# Patient Record
Sex: MALE | Race: WHITE | NOT HISPANIC OR LATINO | Employment: OTHER | URBAN - NONMETROPOLITAN AREA
[De-identification: names, ages, dates, MRNs, and addresses within clinical notes are randomized per-mention and may not be internally consistent; named-entity substitution may affect disease eponyms.]

---

## 2021-12-15 ENCOUNTER — OFFICE VISIT (OUTPATIENT)
Dept: CARDIOLOGY | Facility: CLINIC | Age: 81
End: 2021-12-15

## 2021-12-15 VITALS
OXYGEN SATURATION: 98 % | HEART RATE: 60 BPM | WEIGHT: 253 LBS | SYSTOLIC BLOOD PRESSURE: 110 MMHG | BODY MASS INDEX: 33.53 KG/M2 | DIASTOLIC BLOOD PRESSURE: 62 MMHG | HEIGHT: 73 IN

## 2021-12-15 DIAGNOSIS — I25.810 CORONARY ARTERY DISEASE INVOLVING CORONARY BYPASS GRAFT OF NATIVE HEART WITHOUT ANGINA PECTORIS: ICD-10-CM

## 2021-12-15 DIAGNOSIS — Z98.890 HISTORY OF LOOP RECORDER: ICD-10-CM

## 2021-12-15 DIAGNOSIS — I45.10 RBBB: ICD-10-CM

## 2021-12-15 DIAGNOSIS — Z95.818 PRESENCE OF WATCHMAN LEFT ATRIAL APPENDAGE CLOSURE DEVICE: ICD-10-CM

## 2021-12-15 DIAGNOSIS — R06.09 DOE (DYSPNEA ON EXERTION): ICD-10-CM

## 2021-12-15 DIAGNOSIS — I48.0 PAF (PAROXYSMAL ATRIAL FIBRILLATION) (HCC): ICD-10-CM

## 2021-12-15 DIAGNOSIS — Z95.5 STENTED CORONARY ARTERY: Primary | ICD-10-CM

## 2021-12-15 DIAGNOSIS — I51.7 LVH (LEFT VENTRICULAR HYPERTROPHY): ICD-10-CM

## 2021-12-15 DIAGNOSIS — E78.2 MIXED HYPERLIPIDEMIA: ICD-10-CM

## 2021-12-15 PROBLEM — I10 HTN (HYPERTENSION): Status: RESOLVED | Noted: 2021-12-15 | Resolved: 2021-12-15

## 2021-12-15 PROBLEM — I10 HTN (HYPERTENSION): Status: ACTIVE | Noted: 2021-12-15

## 2021-12-15 PROCEDURE — 99204 OFFICE O/P NEW MOD 45 MIN: CPT | Performed by: INTERNAL MEDICINE

## 2021-12-15 PROCEDURE — 93000 ELECTROCARDIOGRAM COMPLETE: CPT | Performed by: INTERNAL MEDICINE

## 2021-12-15 RX ORDER — LANOLIN ALCOHOL/MO/W.PET/CERES
400 CREAM (GRAM) TOPICAL DAILY
COMMUNITY
End: 2022-08-16

## 2021-12-15 RX ORDER — ASCORBIC ACID 500 MG
500 TABLET ORAL 2 TIMES DAILY
COMMUNITY
End: 2022-09-20

## 2021-12-15 RX ORDER — ATORVASTATIN CALCIUM 40 MG/1
40 TABLET, FILM COATED ORAL DAILY
COMMUNITY

## 2021-12-15 RX ORDER — ASPIRIN 81 MG/1
81 TABLET ORAL DAILY
COMMUNITY

## 2021-12-15 RX ORDER — TRIAMCINOLONE ACETONIDE 1 MG/ML
LOTION TOPICAL AS NEEDED
COMMUNITY
End: 2022-09-20

## 2021-12-15 RX ORDER — LANOLIN ALCOHOL/MO/W.PET/CERES
1000 CREAM (GRAM) TOPICAL DAILY
COMMUNITY
End: 2022-08-16

## 2021-12-15 RX ORDER — IBUPROFEN 200 MG
200 TABLET ORAL AS NEEDED
COMMUNITY
End: 2022-09-20

## 2021-12-15 RX ORDER — CLOBETASOL PROPIONATE 0.5 MG/G
1 CREAM TOPICAL AS NEEDED
COMMUNITY
End: 2022-09-20

## 2021-12-15 RX ORDER — HYDROCHLOROTHIAZIDE 12.5 MG/1
12.5 TABLET ORAL DAILY
COMMUNITY
End: 2022-08-16

## 2021-12-15 RX ORDER — CHLORAL HYDRATE 500 MG
CAPSULE ORAL 2 TIMES DAILY
COMMUNITY
End: 2022-08-16

## 2021-12-15 RX ORDER — NITROGLYCERIN 0.4 MG/1
TABLET SUBLINGUAL
Qty: 25 TABLET | Refills: 11 | Status: SHIPPED | OUTPATIENT
Start: 2021-12-15 | End: 2022-11-09 | Stop reason: SDUPTHER

## 2021-12-15 RX ORDER — CETIRIZINE HYDROCHLORIDE 10 MG/1
10 TABLET ORAL AS NEEDED
COMMUNITY
End: 2022-08-16

## 2021-12-15 RX ORDER — AMLODIPINE BESYLATE 2.5 MG/1
2.5 TABLET ORAL DAILY
COMMUNITY
End: 2022-08-16 | Stop reason: SDUPTHER

## 2021-12-15 RX ORDER — METOPROLOL TARTRATE 50 MG/1
12.5 TABLET, FILM COATED ORAL 2 TIMES DAILY
COMMUNITY
End: 2022-09-20 | Stop reason: SDUPTHER

## 2021-12-15 NOTE — PROGRESS NOTES
Fred Johnson  [id number]  1940  81 y.o.  male    Referring Provider: Chico Perkins MD    Reason for  Visit:  Initial visit to establish care with myself for ongoing longitudinal care related to cardiovascular issues   paroxysmal atrial fibrillation coronary artery disease stented coronary artery   Has left atrial appendage closure     Subjective    Mild chronic exertional shortness of breath on exertion relieved with rest  No significant cough or wheezing    No palpitations  No associated chest pain  No significant pedal edema    No fever or chills  No significant expectoration    No hemoptysis  No presyncope or syncope    Tolerating current medications well with no untoward side effects   Compliant with prescribed medication regimen. Tries to adhere to cardiac diet.     No bleeding, excessive bruising, gait instability or fall risks    BP well controlled at home.           History of present illness:  Fred Johnson is a 81 y.o. yo male with paroxysmal atrial fibrillation coronary artery disease stented coronary artery   Has left atrial appendage closure    who presents today for   Chief Complaint   Patient presents with   • Coronary Artery Disease     Has been well. No chest pain, palpitations, SOA, or edema.    • Establish Care     New patient   .    History  Past Medical History:   Diagnosis Date   • Coronary artery disease    ,   Past Surgical History:   Procedure Laterality Date   • ACHILLES TENDON REPAIR     • CARDIAC CATHETERIZATION     • CATARACT EXTRACTION, BILATERAL     • CLAVICLE SURGERY Right    • CORONARY STENT PLACEMENT     • REPLACEMENT TOTAL KNEE BILATERAL     • ROTATOR CUFF REPAIR     • WRIST ARTHROPLASTY Right    ,   History reviewed. No pertinent family history.,   Social History     Tobacco Use   • Smoking status: Never Smoker   • Smokeless tobacco: Never Used   Vaping Use   • Vaping Use: Never used   Substance Use Topics   • Alcohol use: Yes     Comment: occ   • Drug use: Never    ,     Medications  Current Outpatient Medications   Medication Sig Dispense Refill   • amLODIPine (NORVASC) 2.5 MG tablet Take 2.5 mg by mouth Daily.     • ascorbic acid (VITAMIN C) 500 MG tablet Take 500 mg by mouth 2 (Two) Times a Day.     • aspirin 81 MG EC tablet Take 81 mg by mouth Daily.     • atorvastatin (LIPITOR) 40 MG tablet Take 40 mg by mouth Daily.     • cetirizine (zyrTEC) 10 MG tablet Take 10 mg by mouth As Needed for Allergies.     • clobetasol (TEMOVATE) 0.05 % cream Apply 1 application topically to the appropriate area as directed As Needed.     • folic acid (FOLVITE) 400 MCG tablet Take 400 mcg by mouth Daily.     • hydroCHLOROthiazide (HYDRODIURIL) 12.5 MG tablet Take 12.5 mg by mouth Daily.     • ibuprofen (ADVIL,MOTRIN) 200 MG tablet Take 200 mg by mouth As Needed for Mild Pain .     • methotrexate 2.5 MG tablet Take  by mouth 3 (Three) Doses Each Week. Take Doses 12 (Twelve) Hours Apart.     • metoprolol tartrate (LOPRESSOR) 50 MG tablet Take 25 mg by mouth 2 (Two) Times a Day.     • Omega-3 1000 MG capsule Take  by mouth 2 (Two) Times a Day.     • triamcinolone (KENALOG) 0.1 % lotion Apply  topically to the appropriate area as directed As Needed.     • vitamin B-12 (CYANOCOBALAMIN) 1000 MCG tablet Take 1,000 mcg by mouth Daily.     • nitroglycerin (NITROSTAT) 0.4 MG SL tablet 1 under the tongue as needed for angina, may repeat q5mins for up three doses 25 tablet 11     No current facility-administered medications for this visit.       Allergies:  Percocet [oxycodone-acetaminophen]    Review of Systems  Review of Systems   Constitutional: Negative.   HENT: Negative.    Eyes: Negative.    Cardiovascular: Positive for dyspnea on exertion. Negative for chest pain, claudication, cyanosis, irregular heartbeat, leg swelling, near-syncope, orthopnea, palpitations, paroxysmal nocturnal dyspnea and syncope.   Respiratory: Negative.    Endocrine: Negative.    Hematologic/Lymphatic: Negative.    Skin:  "Negative.    Musculoskeletal: Positive for arthritis, back pain and joint pain.   Gastrointestinal: Negative for anorexia.   Genitourinary: Negative.    Neurological: Negative.    Psychiatric/Behavioral: Negative.        Objective     Physical Exam:  /62   Pulse 60   Ht 185.4 cm (73\")   Wt 115 kg (253 lb)   SpO2 98%   BMI 33.38 kg/m²     Physical Exam  Constitutional:       Appearance: He is well-developed.   HENT:      Head: Normocephalic.   Neck:      Vascular: Normal carotid pulses. No carotid bruit or JVD.      Trachea: No tracheal tenderness or tracheal deviation.   Cardiovascular:      Rate and Rhythm: Regular rhythm.      Pulses: Normal pulses.      Heart sounds: Normal heart sounds.   Pulmonary:      Effort: Pulmonary effort is normal.      Breath sounds: No stridor.   Abdominal:      General: There is no distension.      Palpations: Abdomen is soft.      Tenderness: There is no abdominal tenderness.   Musculoskeletal:      Cervical back: No edema.   Skin:     General: Skin is warm.   Neurological:      Mental Status: He is alert.      Cranial Nerves: No cranial nerve deficit.      Sensory: No sensory deficit.   Psychiatric:         Speech: Speech normal.         Behavior: Behavior normal.         Results Review:     ____________________________________________________________________________________________________________________________________________  Health maintenance and recommendations    Low salt/ HTN/ Heart healthy carbohydrate restricted cardiac diet   The patient is advised to reduce or avoid caffeine or other cardiac stimulants.   Minimize or avoid  NSAID-type medications      Monitor for any signs of bleeding including red or dark stools. Fall precautions.   Advised staying uptodate with immunizations per established standard guidelines.    Offered to give patient  a copy of my notes     Questions were encouraged, asked and answered to the patient's  understanding and satisfaction. " Questions if any regarding current medications and side effects, need for refills and importance of compliance to medications stressed.    Reviewed available prior notes, consults, prior visits, laboratory findings, radiology and cardiology relevant reports. Updated chart as applicable. I have reviewed the patient's medical history in detail and updated the computerized patient record as relevant.      Updated patient regarding any new or relevant abnormalities on review of records or any new findings on physical exam. Mentioned to patient about purpose of visit and desirable health short and long term goals and objectives.    Primary to monitor CBC CMP Lipid panel and TSH as applicable    ___________________________________________________________________________________________________________________________________________     ECG 12 Lead    Date/Time: 12/15/2021 12:00 PM  Performed by: Everton Wasserman MD  Authorized by: Everton Wasserman MD   Comparison: not compared with previous ECG   Rhythm: sinus bradycardia  Rate: bradycardic  Conduction: right bundle branch block and 1st degree AV block  Q waves: II, III, aVF, V4, V5 and V6    Other findings: left ventricular hypertrophy    Clinical impression: abnormal EKG            Assessment/Plan   Diagnoses and all orders for this visit:    1. Stented coronary artery (Primary)    2. Coronary artery disease involving coronary bypass graft of native heart without angina pectoris    3. PAF (paroxysmal atrial fibrillation) (HCC)    4. Presence of Watchman left atrial appendage closure device    5. MUIR (dyspnea on exertion)  -     Adult Transthoracic Echo Complete w/ Color, Spectral and Contrast if necessary per protocol; Future    6. Mixed hyperlipidemia    7. RBBB    8. LVH (left ventricular hypertrophy)    9. History of loop recorder    Other orders  -     ECG 12 Lead  -     nitroglycerin (NITROSTAT) 0.4 MG SL tablet; 1 under the tongue as needed for angina, may repeat  q5mins for up three doses  Dispense: 25 tablet; Refill: 11          Plan    Orders Placed This Encounter   Procedures   • ECG 12 Lead     This order was created via procedure documentation     Order Specific Question:   Release to patient     Answer:   Immediate   • Adult Transthoracic Echo Complete w/ Color, Spectral and Contrast if necessary per protocol     Myocardial strain to be performed during echocardiogram as long as technically feasible     Standing Status:   Future     Standing Expiration Date:   12/15/2022     Order Specific Question:   Reason for exam?     Answer:   Dyspnea     Order Specific Question:   Release to patient     Answer:   Immediate      In absence of chest pains or any other suggestive symptoms stress testing is currently not warranted.  Discussed with patient.     Keep LDL below 70 mg/dl. Monitor liver and renal functions.   Monitor CBC, CMP, TSH (as indicated) and Lipid Panel by primary     S/L NTG PRN for chest pain, call me or go to ER if has to use S/L nitroglycerin   Requested Prescriptions     Signed Prescriptions Disp Refills   • nitroglycerin (NITROSTAT) 0.4 MG SL tablet 25 tablet 11     Si under the tongue as needed for angina, may repeat q5mins for up three doses        Check BP and heart rates twice daily initially till blood pressures and heart rates under good control and then at least 3x / week,   If blood pressures continue to be well-controlled then can check week a month  at home and bring a recording for review next visit  If BP >130/85 or < 100/60 persistently over 3 reading 30 mins apart or if heart rates persistently above 100 bpm or less than 55 bpm call sooner for evaluation and advise     Monitor for any signs of bleeding including red or dark stools as well as easy bruisabilty. Fall precautions.   Monitor cardiac rhythm device function regularly per established schedule or PRN      Follow up with DANII Monreal  or myself              Return in about 2  months (around 2/15/2022).

## 2022-02-16 ENCOUNTER — OFFICE VISIT (OUTPATIENT)
Dept: CARDIOLOGY | Facility: CLINIC | Age: 82
End: 2022-02-16

## 2022-02-16 ENCOUNTER — HOSPITAL ENCOUNTER (OUTPATIENT)
Dept: CARDIOLOGY | Facility: HOSPITAL | Age: 82
Discharge: HOME | End: 2022-02-16
Admitting: INTERNAL MEDICINE

## 2022-02-16 ENCOUNTER — CLINICAL SUPPORT NO REQUIREMENTS (OUTPATIENT)
Dept: CARDIOLOGY | Facility: CLINIC | Age: 82
End: 2022-02-16

## 2022-02-16 VITALS
HEART RATE: 52 BPM | BODY MASS INDEX: 33.4 KG/M2 | OXYGEN SATURATION: 94 % | HEIGHT: 73 IN | DIASTOLIC BLOOD PRESSURE: 60 MMHG | WEIGHT: 252 LBS | SYSTOLIC BLOOD PRESSURE: 112 MMHG

## 2022-02-16 VITALS
WEIGHT: 252 LBS | SYSTOLIC BLOOD PRESSURE: 110 MMHG | BODY MASS INDEX: 33.4 KG/M2 | HEIGHT: 73 IN | DIASTOLIC BLOOD PRESSURE: 62 MMHG

## 2022-02-16 DIAGNOSIS — Z95.5 STENTED CORONARY ARTERY: Primary | ICD-10-CM

## 2022-02-16 DIAGNOSIS — Z98.890 HISTORY OF LOOP RECORDER: ICD-10-CM

## 2022-02-16 DIAGNOSIS — E78.2 MIXED HYPERLIPIDEMIA: ICD-10-CM

## 2022-02-16 DIAGNOSIS — I25.810 CORONARY ARTERY DISEASE INVOLVING CORONARY BYPASS GRAFT OF NATIVE HEART WITHOUT ANGINA PECTORIS: ICD-10-CM

## 2022-02-16 DIAGNOSIS — I48.0 PAF (PAROXYSMAL ATRIAL FIBRILLATION): ICD-10-CM

## 2022-02-16 DIAGNOSIS — R06.09 DOE (DYSPNEA ON EXERTION): ICD-10-CM

## 2022-02-16 DIAGNOSIS — Z95.818 PRESENCE OF WATCHMAN LEFT ATRIAL APPENDAGE CLOSURE DEVICE: ICD-10-CM

## 2022-02-16 PROCEDURE — 93291 INTERROG DEV EVAL SCRMS IP: CPT | Performed by: INTERNAL MEDICINE

## 2022-02-16 PROCEDURE — 93306 TTE W/DOPPLER COMPLETE: CPT

## 2022-02-16 PROCEDURE — 99214 OFFICE O/P EST MOD 30 MIN: CPT | Performed by: INTERNAL MEDICINE

## 2022-02-16 PROCEDURE — 25010000002 PERFLUTREN 6.52 MG/ML SUSPENSION: Performed by: INTERNAL MEDICINE

## 2022-02-16 PROCEDURE — 93356 MYOCRD STRAIN IMG SPCKL TRCK: CPT | Performed by: INTERNAL MEDICINE

## 2022-02-16 PROCEDURE — 93356 MYOCRD STRAIN IMG SPCKL TRCK: CPT

## 2022-02-16 PROCEDURE — 93306 TTE W/DOPPLER COMPLETE: CPT | Performed by: INTERNAL MEDICINE

## 2022-02-16 RX ADMIN — PERFLUTREN 8.48 MG: 6.52 INJECTION, SUSPENSION INTRAVENOUS at 12:03

## 2022-02-16 NOTE — PROGRESS NOTES
Fred Johnson  [id number]  1940  81 y.o.  male    Referring Provider: Chico Perkins MD    Reason for  Visit:    Here for routine follow up   Initial visit to establish care with myself for ongoing longitudinal care related to cardiovascular issues   paroxysmal atrial fibrillation coronary artery disease stented coronary artery   Has left atrial appendage closure   No echo has been done till today     Subjective      Overall feels the same   No new events or complaints since last visit   Overall the patient feels no major change from baseline symptoms   Similar symptoms as during last visit      Mild chronic exertional shortness of breath on exertion relieved with rest  No significant cough or wheezing    No palpitations  No associated chest pain  No significant pedal edema    No fever or chills  No significant expectoration    No hemoptysis  No presyncope or syncope    Tolerating current medications well with no untoward side effects   Compliant with prescribed medication regimen. Tries to adhere to cardiac diet.     No bleeding, excessive bruising, gait instability or fall risks    BP well controlled at home.       Wants implantable loop recorder removed         History of present illness:  Fred Johnson is a 81 y.o. yo male with paroxysmal atrial fibrillation coronary artery disease stented coronary artery   Has left atrial appendage closure    who presents today for   Chief Complaint   Patient presents with   • Coronary Artery Disease     2 MONTH FOLLOW UP    .    History  Past Medical History:   Diagnosis Date   • Coronary artery disease    ,   Past Surgical History:   Procedure Laterality Date   • ACHILLES TENDON REPAIR     • CARDIAC CATHETERIZATION     • CATARACT EXTRACTION, BILATERAL     • CLAVICLE SURGERY Right    • CORONARY STENT PLACEMENT     • REPLACEMENT TOTAL KNEE BILATERAL     • ROTATOR CUFF REPAIR     • WRIST ARTHROPLASTY Right    ,   History reviewed. No pertinent family history.,    Social History     Tobacco Use   • Smoking status: Never Smoker   • Smokeless tobacco: Never Used   Vaping Use   • Vaping Use: Never used   Substance Use Topics   • Alcohol use: Yes     Comment: occ   • Drug use: Never   ,     Medications  Current Outpatient Medications   Medication Sig Dispense Refill   • amLODIPine (NORVASC) 2.5 MG tablet Take 2.5 mg by mouth Daily.     • ascorbic acid (VITAMIN C) 500 MG tablet Take 500 mg by mouth 2 (Two) Times a Day.     • aspirin 81 MG EC tablet Take 81 mg by mouth Daily.     • atorvastatin (LIPITOR) 40 MG tablet Take 40 mg by mouth Daily.     • cetirizine (zyrTEC) 10 MG tablet Take 10 mg by mouth As Needed for Allergies.     • clobetasol (TEMOVATE) 0.05 % cream Apply 1 application topically to the appropriate area as directed As Needed.     • folic acid (FOLVITE) 400 MCG tablet Take 400 mcg by mouth Daily.     • hydroCHLOROthiazide (HYDRODIURIL) 12.5 MG tablet Take 12.5 mg by mouth Daily.     • ibuprofen (ADVIL,MOTRIN) 200 MG tablet Take 200 mg by mouth As Needed for Mild Pain .     • methotrexate 2.5 MG tablet Take  by mouth 3 (Three) Doses Each Week. Take Doses 12 (Twelve) Hours Apart.     • metoprolol tartrate (LOPRESSOR) 50 MG tablet Take 25 mg by mouth 2 (Two) Times a Day.     • nitroglycerin (NITROSTAT) 0.4 MG SL tablet 1 under the tongue as needed for angina, may repeat q5mins for up three doses 25 tablet 11   • Omega-3 1000 MG capsule Take  by mouth 2 (Two) Times a Day.     • triamcinolone (KENALOG) 0.1 % lotion Apply  topically to the appropriate area as directed As Needed.     • vitamin B-12 (CYANOCOBALAMIN) 1000 MCG tablet Take 1,000 mcg by mouth Daily.       No current facility-administered medications for this visit.       Allergies:  Percocet [oxycodone-acetaminophen]    Review of Systems  Review of Systems   Constitutional: Negative.   HENT: Negative.    Eyes: Negative.    Cardiovascular: Positive for dyspnea on exertion. Negative for chest pain, claudication,  "cyanosis, irregular heartbeat, leg swelling, near-syncope, orthopnea, palpitations, paroxysmal nocturnal dyspnea and syncope.   Respiratory: Negative.    Endocrine: Negative.    Hematologic/Lymphatic: Negative.    Skin: Negative.    Musculoskeletal: Positive for arthritis, back pain and joint pain.   Gastrointestinal: Negative for anorexia.   Genitourinary: Negative.    Neurological: Negative.    Psychiatric/Behavioral: Negative.        Objective     Physical Exam:  /60   Pulse 52   Ht 185.4 cm (73\")   Wt 114 kg (252 lb)   SpO2 94%   BMI 33.25 kg/m²     Physical Exam  Constitutional:       Appearance: He is well-developed.   HENT:      Head: Normocephalic.   Neck:      Vascular: Normal carotid pulses. No carotid bruit or JVD.      Trachea: No tracheal tenderness or tracheal deviation.   Cardiovascular:      Rate and Rhythm: Regular rhythm.      Pulses: Normal pulses.      Heart sounds: Normal heart sounds.   Pulmonary:      Effort: Pulmonary effort is normal.      Breath sounds: No stridor.   Abdominal:      General: There is no distension.      Palpations: Abdomen is soft.      Tenderness: There is no abdominal tenderness.   Musculoskeletal:      Cervical back: No edema.   Skin:     General: Skin is warm.   Neurological:      Mental Status: He is alert.      Cranial Nerves: No cranial nerve deficit.      Sensory: No sensory deficit.   Psychiatric:         Speech: Speech normal.         Behavior: Behavior normal.         Results Review:      ____________________________________________________________________________________________________________________________________________  Health maintenance and recommendations    Low salt/ HTN/ Heart healthy carbohydrate restricted cardiac diet   The patient is advised to reduce or avoid caffeine or other cardiac stimulants.   Minimize or avoid  NSAID-type medications      Monitor for any signs of bleeding including red or dark stools. Fall precautions.   Advised " staying uptodate with immunizations per established standard guidelines.    Offered to give patient  a copy of my notes     Questions were encouraged, asked and answered to the patient's  understanding and satisfaction. Questions if any regarding current medications and side effects, need for refills and importance of compliance to medications stressed.    Reviewed available prior notes, consults, prior visits, laboratory findings, radiology and cardiology relevant reports. Updated chart as applicable. I have reviewed the patient's medical history in detail and updated the computerized patient record as relevant.      Updated patient regarding any new or relevant abnormalities on review of records or any new findings on physical exam. Mentioned to patient about purpose of visit and desirable health short and long term goals and objectives.    Primary to monitor CBC CMP Lipid panel and TSH as applicable    ___________________________________________________________________________________________________________________________________________   Procedures    Assessment/Plan   Diagnoses and all orders for this visit:    1. Stented coronary artery x 6 last > 1 year ago (Primary)    2. PAF (paroxysmal atrial fibrillation) (HCC)    3. Presence of Watchman left atrial appendage closure device    4. MUIR (dyspnea on exertion)    5. Coronary artery disease involving coronary bypass graft of native heart without angina pectoris    6. History of loop recorder    7. Mixed hyperlipidemia          Plan    Call for results of echo done today     Functioning implantable loop recorder    Monitor cardiac rhythm device function regularly per established schedule or PRN      In absence of chest pains or any other suggestive symptoms stress testing is currently not warranted.  Discussed with patient.     Keep LDL below 70 mg/dl. Monitor liver and renal functions.   Monitor CBC, CMP, TSH (as indicated) and Lipid Panel by primary     S/L  NTG PRN for chest pain, call me or go to ER if has to use S/L nitroglycerin     Check BP and heart rates twice daily initially till blood pressures and heart rates under good control and then at least 3x / week,   If blood pressures continue to be well-controlled then can check week a month  at home and bring a recording for review next visit  If BP >130/85 or < 100/60 persistently over 3 reading 30 mins apart or if heart rates persistently above 100 bpm or less than 55 bpm call sooner for evaluation and advise     Monitor for any signs of bleeding including red or dark stools as well as easy bruisabilty. Fall precautions.   Monitor cardiac rhythm device function regularly per established schedule or PRN      Follow up with DANII Monreal  or myself              Return in about 6 months (around 8/16/2022).

## 2022-02-17 ENCOUNTER — TELEPHONE (OUTPATIENT)
Dept: CARDIOLOGY | Facility: CLINIC | Age: 82
End: 2022-02-17

## 2022-02-17 LAB
BH CV ECHO LEFT VENTRICLE GLOBAL LONGITUDINAL STRAIN: -12 %
BH CV ECHO MEAS - AO MAX PG (FULL): 4.8 MMHG
BH CV ECHO MEAS - AO MAX PG: 8.2 MMHG
BH CV ECHO MEAS - AO MEAN PG (FULL): 2 MMHG
BH CV ECHO MEAS - AO MEAN PG: 4 MMHG
BH CV ECHO MEAS - AO ROOT AREA (BSA CORRECTED): 1.4
BH CV ECHO MEAS - AO ROOT AREA: 8.6 CM^2
BH CV ECHO MEAS - AO ROOT DIAM: 3.3 CM
BH CV ECHO MEAS - AO V2 MAX: 143 CM/SEC
BH CV ECHO MEAS - AO V2 MEAN: 91.2 CM/SEC
BH CV ECHO MEAS - AO V2 VTI: 36.7 CM
BH CV ECHO MEAS - AVA(I,A): 2.4 CM^2
BH CV ECHO MEAS - AVA(I,D): 2.4 CM^2
BH CV ECHO MEAS - AVA(V,A): 2.5 CM^2
BH CV ECHO MEAS - AVA(V,D): 2.5 CM^2
BH CV ECHO MEAS - BSA(HAYCOCK): 2.5 M^2
BH CV ECHO MEAS - BSA: 2.4 M^2
BH CV ECHO MEAS - BZI_BMI: 33.2 KILOGRAMS/M^2
BH CV ECHO MEAS - BZI_METRIC_HEIGHT: 185.4 CM
BH CV ECHO MEAS - BZI_METRIC_WEIGHT: 114.3 KG
BH CV ECHO MEAS - EDV(CUBED): 155.7 ML
BH CV ECHO MEAS - EDV(MOD-SP4): 138 ML
BH CV ECHO MEAS - EDV(TEICH): 140.1 ML
BH CV ECHO MEAS - EF(CUBED): 69 %
BH CV ECHO MEAS - EF(MOD-SP4): 62.9 %
BH CV ECHO MEAS - EF(TEICH): 60.1 %
BH CV ECHO MEAS - ESV(CUBED): 48.2 ML
BH CV ECHO MEAS - ESV(MOD-SP4): 51.2 ML
BH CV ECHO MEAS - ESV(TEICH): 55.9 ML
BH CV ECHO MEAS - FS: 32.3 %
BH CV ECHO MEAS - IVS/LVPW: 1.2
BH CV ECHO MEAS - IVSD: 1.7 CM
BH CV ECHO MEAS - LA DIMENSION: 4.1 CM
BH CV ECHO MEAS - LA/AO: 1.2
BH CV ECHO MEAS - LAT PEAK E' VEL: 7.1 CM/SEC
BH CV ECHO MEAS - LV DIASTOLIC VOL/BSA (35-75): 58.1 ML/M^2
BH CV ECHO MEAS - LV MASS(C)D: 382 GRAMS
BH CV ECHO MEAS - LV MASS(C)DI: 160.9 GRAMS/M^2
BH CV ECHO MEAS - LV MAX PG: 3.4 MMHG
BH CV ECHO MEAS - LV MEAN PG: 2 MMHG
BH CV ECHO MEAS - LV SYSTOLIC VOL/BSA (12-30): 21.6 ML/M^2
BH CV ECHO MEAS - LV V1 MAX: 92.3 CM/SEC
BH CV ECHO MEAS - LV V1 MEAN: 55 CM/SEC
BH CV ECHO MEAS - LV V1 VTI: 23 CM
BH CV ECHO MEAS - LVIDD: 5.4 CM
BH CV ECHO MEAS - LVIDS: 3.6 CM
BH CV ECHO MEAS - LVLD AP4: 8.3 CM
BH CV ECHO MEAS - LVLS AP4: 7.6 CM
BH CV ECHO MEAS - LVOT AREA (M): 3.8 CM^2
BH CV ECHO MEAS - LVOT AREA: 3.8 CM^2
BH CV ECHO MEAS - LVOT DIAM: 2.2 CM
BH CV ECHO MEAS - LVPWD: 1.4 CM
BH CV ECHO MEAS - MED PEAK E' VEL: 6.53 CM/SEC
BH CV ECHO MEAS - MR MAX PG: 38.9 MMHG
BH CV ECHO MEAS - MR MAX VEL: 312 CM/SEC
BH CV ECHO MEAS - MV A MAX VEL: 88.7 CM/SEC
BH CV ECHO MEAS - MV DEC SLOPE: 301 CM/SEC^2
BH CV ECHO MEAS - MV DEC TIME: 0.22 SEC
BH CV ECHO MEAS - MV E MAX VEL: 91.3 CM/SEC
BH CV ECHO MEAS - MV E/A: 1
BH CV ECHO MEAS - MV P1/2T MAX VEL: 102 CM/SEC
BH CV ECHO MEAS - MV P1/2T: 99.3 MSEC
BH CV ECHO MEAS - MVA P1/2T LCG: 2.2 CM^2
BH CV ECHO MEAS - MVA(P1/2T): 2.2 CM^2
BH CV ECHO MEAS - PI END-D VEL: 91 CM/SEC
BH CV ECHO MEAS - RAP SYSTOLE: 5 MMHG
BH CV ECHO MEAS - RVDD: 3.5 CM
BH CV ECHO MEAS - RVSP: 43.2 MMHG
BH CV ECHO MEAS - SI(AO): 132.2 ML/M^2
BH CV ECHO MEAS - SI(CUBED): 45.3 ML/M^2
BH CV ECHO MEAS - SI(LVOT): 36.8 ML/M^2
BH CV ECHO MEAS - SI(MOD-SP4): 36.6 ML/M^2
BH CV ECHO MEAS - SI(TEICH): 35.5 ML/M^2
BH CV ECHO MEAS - SV(AO): 313.9 ML
BH CV ECHO MEAS - SV(CUBED): 107.5 ML
BH CV ECHO MEAS - SV(LVOT): 87.4 ML
BH CV ECHO MEAS - SV(MOD-SP4): 86.8 ML
BH CV ECHO MEAS - SV(TEICH): 84.2 ML
BH CV ECHO MEAS - TR MAX VEL: 309 CM/SEC
BH CV ECHO MEASUREMENTS AVERAGE E/E' RATIO: 13.4
LEFT ATRIUM VOLUME INDEX: 35.4 ML/M2
LEFT ATRIUM VOLUME: 83.9 CM3

## 2022-02-17 NOTE — TELEPHONE ENCOUNTER
Patient is new to Dr. Wasserman and has a [employer] ICM that was implanted on 2/17/2020.  Per [employer], the ICM is followed by [facility].  RN called the number provided by [employer] to request the patient be released in the [employer]  website.  Message left for staff to return call.

## 2022-02-22 ENCOUNTER — TELEPHONE (OUTPATIENT)
Dept: CARDIOLOGY | Facility: CLINIC | Age: 82
End: 2022-02-22

## 2022-02-22 NOTE — PROGRESS NOTES
ICM Report- Clinic Interrogation    February 22, 2022    Primary Cardiologist:  Lisy  Reason for implant:  suspected a-fib  Battery:  Ok  60% remaining  Events:  10 recent episodes of bradycardia noted.  Longest duration is 56 seconds.  Minimum rate is 36 bpm.  Some episodes are undersensing PACs.  Changes:  none    Follow up:  1 month by remote transmission

## 2022-02-22 NOTE — TELEPHONE ENCOUNTER
----- Message from Everton Wasserman MD sent at 2/17/2022  6:27 AM CST -----  Regarding: Echo  If you could let patient know that his heart muscle is strong on echoKeep follow-up appointment

## 2022-02-27 NOTE — PROGRESS NOTES
Agree with assessment and plan of mid level provider as below with any changes if made as noted by Adriana Xiong PA-C  of Fred Johnson.

## 2022-03-24 ENCOUNTER — TELEPHONE (OUTPATIENT)
Dept: CARDIOLOGY | Facility: CLINIC | Age: 82
End: 2022-03-24

## 2022-03-24 NOTE — TELEPHONE ENCOUNTER
Patient has frequent episodes of bradycardia on his implanted cardiac monitor ([employer]).  RN left message for patient to return call to assess for lightheadedness, near syncope, syncope, and to establish patient's normal sleeping hours.      Also noted, device under senses PVCs.  RN will set up an appointment to adjust sensitivity of device next time patient is in [place] - not urgent.

## 2022-03-30 PROCEDURE — 93298 REM INTERROG DEV EVAL SCRMS: CPT | Performed by: INTERNAL MEDICINE

## 2022-03-30 PROCEDURE — G2066 INTER DEVC REMOTE 30D: HCPCS | Performed by: INTERNAL MEDICINE

## 2022-03-31 NOTE — TELEPHONE ENCOUNTER
"Per patient report, he goes to sleep at approximately 21:00 and wakes up between 04:00 and 06:00.  Patient will contact RN the next time he is in [place] so that RN can adjust sensitivity on his device.  RN's direct line provided to patient.    Per [employer] rep, Angel Valencia, the patient's sensitivity needs adjusted as it is undersensing PVCs.  Go to sensing consult option and for the undersensing of the small PVCs select \"sense small PVCs\".      "

## 2022-04-30 PROCEDURE — 93298 REM INTERROG DEV EVAL SCRMS: CPT | Performed by: INTERNAL MEDICINE

## 2022-04-30 PROCEDURE — G2066 INTER DEVC REMOTE 30D: HCPCS | Performed by: INTERNAL MEDICINE

## 2022-06-16 ENCOUNTER — CLINICAL SUPPORT NO REQUIREMENTS (OUTPATIENT)
Dept: CARDIOLOGY | Facility: CLINIC | Age: 82
End: 2022-06-16

## 2022-06-16 DIAGNOSIS — Z95.818 STATUS POST PLACEMENT OF IMPLANTABLE LOOP RECORDER: Primary | ICD-10-CM

## 2022-06-16 DIAGNOSIS — I48.0 PAF (PAROXYSMAL ATRIAL FIBRILLATION): ICD-10-CM

## 2022-06-16 PROCEDURE — 93285 PRGRMG DEV EVAL SCRMS IP: CPT | Performed by: INTERNAL MEDICINE

## 2022-06-17 NOTE — PROGRESS NOTES
"    ICM Report- IN OFFICE    June 16, 2022    Primary Cardiologist:  Lisy  Reason for implant:  suspected a-fib  Battery:  Good, Implanted 2/17/2020  Events:  No episodes assessed today, just changing sensitivity settings.  Changes:  SensingConsult changed to \"Sense small PVCs\"    Follow up:  Monthly and PRN via remote monitor    "

## 2022-06-20 NOTE — PROGRESS NOTES
I have reviewed the notes, assessments, and/or procedures performed by  Obdulia Aleman RN, I concur with her  documentation  of Fred Johnson.

## 2022-08-15 PROBLEM — Z86.79 S/P ABLATION OF ATRIAL FIBRILLATION: Status: ACTIVE | Noted: 2022-08-15

## 2022-08-15 PROBLEM — E66.811 CLASS 1 OBESITY DUE TO EXCESS CALORIES WITH SERIOUS COMORBIDITY AND BODY MASS INDEX (BMI) OF 33.0 TO 33.9 IN ADULT: Status: ACTIVE | Noted: 2022-08-15

## 2022-08-15 PROBLEM — I10 PRIMARY HYPERTENSION: Status: ACTIVE | Noted: 2022-08-15

## 2022-08-15 PROBLEM — I25.10 CORONARY ARTERY DISEASE INVOLVING NATIVE CORONARY ARTERY OF NATIVE HEART WITHOUT ANGINA PECTORIS: Status: ACTIVE | Noted: 2021-12-15

## 2022-08-15 PROBLEM — Z98.890 S/P ABLATION OF ATRIAL FIBRILLATION: Status: ACTIVE | Noted: 2022-08-15

## 2022-08-15 PROBLEM — I27.20 PULMONARY HYPERTENSION: Status: ACTIVE | Noted: 2022-08-15

## 2022-08-15 PROBLEM — E66.09 CLASS 1 OBESITY DUE TO EXCESS CALORIES WITH SERIOUS COMORBIDITY AND BODY MASS INDEX (BMI) OF 33.0 TO 33.9 IN ADULT: Status: ACTIVE | Noted: 2022-08-15

## 2022-08-15 PROBLEM — I51.9 LEFT VENTRICULAR DIASTOLIC DYSFUNCTION: Status: ACTIVE | Noted: 2022-08-15

## 2022-08-15 NOTE — PROGRESS NOTES
Chief Complaint  Coronary Artery Disease (6mo F/U. )    Marcela Johnson presents to [facility] CARDIOLOGY for routine follow-up.  He has coronary artery disease status post stents x5 with most recent being in 2019, paroxysmal atrial fibrillation status post ablation and watchman left atrial appendage closure device in 2019, left ventricular hypertrophy, chronic diastolic congestive heart failure, pulmonary hypertension, hypertension, hyperlipidemia, right bundle branch block, obstructive sleep apnea and obesity. He complains of a six to eight month history of progressively worsening decreased stamina. He complains of chronic dyspnea with mild exertion that has progressively worsened. He reports intermittent bilateral lower extremity edema and abdominal bloating as well. Patient denies chest pain, palpitations, dizziness, syncope, orthopnea, or PND.  Patient denies any signs of bleeding.    Coronary Artery Disease  Presents for follow-up visit. Symptoms include shortness of breath. Pertinent negatives include no chest pain, chest pressure, chest tightness, dizziness, leg swelling, muscle weakness, palpitations or weight gain. Risk factors include hyperlipidemia and obesity. Risk factors do not include hypertension. His past medical history is significant for CHF. The symptoms have been stable. Compliance with diet is variable. Compliance with exercise is variable. Compliance with medications is good.   Atrial Fibrillation  Presents for follow-up visit. Symptoms include shortness of breath. Symptoms are negative for an AICD problem, bradycardia, chest pain, dizziness, hemodynamic instability, hypertension, hypotension, pacemaker problem, palpitations, syncope, tachycardia and weakness. The symptoms have been stable. Past medical history includes atrial fibrillation, CAD, CHF and hyperlipidemia.   Hypertension  This is a chronic problem. The current episode started more than 1 year  ago. The problem is uncontrolled. Associated symptoms include malaise/fatigue and shortness of breath. Pertinent negatives include no anxiety, blurred vision, chest pain, headaches, neck pain, orthopnea, palpitations, peripheral edema, PND or sweats. Risk factors for coronary artery disease include male gender, obesity and dyslipidemia. Current antihypertension treatment includes calcium channel blockers, diuretics and beta blockers. The current treatment provides significant improvement. Hypertensive end-organ damage includes CAD/MI, heart failure and left ventricular hypertrophy.   Hyperlipidemia  This is a chronic problem. The current episode started more than 1 year ago. Exacerbating diseases include obesity. Associated symptoms include shortness of breath. Pertinent negatives include no chest pain. Current antihyperlipidemic treatment includes statins. Risk factors for coronary artery disease include hypertension, male sex, obesity and dyslipidemia.   Congestive Heart Failure  Presents for follow-up visit. Associated symptoms include edema and shortness of breath. Pertinent negatives include no abdominal pain, chest pain, chest pressure, claudication, fatigue, muscle weakness, near-syncope, nocturia, orthopnea, palpitations, paroxysmal nocturnal dyspnea or unexpected weight change. The symptoms have been stable. His past medical history is significant for CAD. Compliance with total regimen is 51-75%. Compliance with diet is 51-75%. Compliance with exercise is 51-75%. Compliance with medications is %.       Objective     Current Outpatient Medications:   •  ascorbic acid (VITAMIN C) 500 MG tablet, Take 500 mg by mouth 2 (Two) Times a Day., Disp: , Rfl:   •  aspirin 81 MG EC tablet, Take 81 mg by mouth Daily., Disp: , Rfl:   •  atorvastatin (LIPITOR) 40 MG tablet, Take 40 mg by mouth Daily., Disp: , Rfl:   •  clobetasol (TEMOVATE) 0.05 % cream, Apply 1 application topically to the appropriate area as  "directed As Needed., Disp: , Rfl:   •  Guselkumab (Tremfya) 100 MG/ML solution pen-injector, Inject  under the skin into the appropriate area as directed., Disp: , Rfl:   •  ibuprofen (ADVIL,MOTRIN) 200 MG tablet, Take 200 mg by mouth As Needed for Mild Pain ., Disp: , Rfl:   •  metoprolol tartrate (LOPRESSOR) 50 MG tablet, Take 25 mg by mouth 2 (Two) Times a Day., Disp: , Rfl:   •  nitroglycerin (NITROSTAT) 0.4 MG SL tablet, 1 under the tongue as needed for angina, may repeat q5mins for up three doses, Disp: 25 tablet, Rfl: 11  •  triamcinolone (KENALOG) 0.1 % lotion, Apply  topically to the appropriate area as directed As Needed., Disp: , Rfl:   •  sacubitril-valsartan (Entresto) 24-26 MG tablet, Take 1 tablet by mouth 2 (Two) Times a Day., Disp: 180 tablet, Rfl: 3  Vital Signs:   /78   Pulse 60   Ht 185.4 cm (73\")   Wt 115 kg (254 lb)   SpO2 98%   BMI 33.51 kg/m²     Vitals and nursing note reviewed.   Constitutional:       General: Awake.      Appearance: Normal and healthy appearance. Well-developed and not in distress. Obese.   Eyes:      General: Lids are normal.      Conjunctiva/sclera: Conjunctivae normal.      Pupils: Pupils are equal, round, and reactive to light.   HENT:      Head: Normocephalic and atraumatic.      Nose: Nose normal.   Neck:      Vascular: No JVR. JVD normal.   Pulmonary:      Effort: Pulmonary effort is normal.      Breath sounds: Normal breath sounds. No wheezing. No rhonchi. No rales.   Chest:      Chest wall: Not tender to palpatation.   Cardiovascular:      PMI at left midclavicular line. Normal rate. Irregularly irregular rhythm. Normal S1. Normal S2.      Murmurs: There is no murmur.      No gallop. No click. No rub.   Pulses:     Intact distal pulses.   Edema:     Peripheral edema absent.   Abdominal:      General: Bowel sounds are normal.      Palpations: Abdomen is soft.      Tenderness: There is no abdominal tenderness.   Musculoskeletal: Normal range of motion.    "      General: No tenderness.      Cervical back: Normal range of motion. Skin:     General: Skin is warm and dry.   Neurological:      General: No focal deficit present.      Mental Status: Alert, oriented to person, place, and time and oriented to person, place and time.   Psychiatric:         Attention and Perception: Attention and perception normal.         Mood and Affect: Mood and affect normal.         Speech: Speech normal.         Behavior: Behavior normal. Behavior is cooperative.         Thought Content: Thought content normal.         Cognition and Memory: Cognition and memory normal.         Judgment: Judgment normal.        Result Review :   The following data was reviewed by: DANII Tsang on 08/16/2022:      Data reviewed: Cardiology studies 2d echo 2/16/22     ECG 12 Lead    Date/Time: 8/16/2022 10:28 AM  Performed by: Brianda Loja APRN  Authorized by: Brianda Loja APRN   Comparison: compared with previous ECG from 12/15/2021  Similar to previous ECG  Rhythm: sinus rhythm and atrial fibrillation  Ectopy: atrial premature contractions  Rate: normal  BPM: 60  Conduction: right bundle branch block    Clinical impression: abnormal EKG              Assessment and Plan    Diagnoses and all orders for this visit:    1. Coronary artery disease involving native coronary artery of native heart without angina pectoris (Primary)-no clinical signs of ischemia.  Stable.    2. Stented coronary artery-x5 with the most recent being in 2019.  Patient continues on aspirin.  Denies bleeding.    3. PAF (paroxysmal atrial fibrillation) ([facility])- status post ablation.  2% burden on Linq interrogation 7/21/22 and 7/29/22. In sinus rhythm on EKG today. Rate controlled. Stable.  Continue metoprolol tartrate.    4. S/P ablation of atrial fibrillation- 2019 in [place].     5. Presence of Watchman left atrial appendage closure device-2019.    6. LVH (left ventricular hypertrophy)-mild concentric hypertrophy  on 2D echo 2/16/2022.  Stable.    7. Chronic diastolic congestive heart failure (HCC)-grade 2 left ventricular diastolic dysfunction on 2D echo 2/16/2022. NYHA class III. Stop amlodipine and HCTZ. Start Entresto 24/26 mg twice daily. BMP in one week. Reviewed signs and symptoms of CHF and what to report with the patient. Patient instructed to restrict sodium and weigh daily. Report weight gain of greater than 2 lbs overnight or 5 lbs in 1 week. Pt verbalized understanding of instructions and plan of care.     8. Pulmonary hypertension (HCC)-mild on 2D echo 2/16/2022.    9. Primary hypertension-blood pressure is elevated in office today. Pt reports consistently higher than 130/90 at home. Continue to monitor following above medication adjustments. Continue metoprolol tartrate.  Monitor and record daily blood pressure. Report readings consistently higher than 130/90 or consistently lower than 100/60.     10. Mixed hyperlipidemia-management per PCP.  Continue atorvastatin.    11. RBBB-chronic.  Stable.    12. Obstructive sleep apnea- pt is non-compliant with CPAP. He has been advised of potential health risks of untreated sleep apnea, including hypertension, atrial fibrillation, CHF and increased risk of stroke. He verbalized understanding.     13. Class 1 obesity due to excess calories with serious comorbidity and body mass index (BMI) of 33.0 to 33.9 in adult- BMI is >= 30 and <35. (Class 1 Obesity). The following options were offered after discussion;: weight loss educational material (shared in after visit summary).       Follow Up   Return in about 4 weeks (around 9/13/2022) for Next scheduled follow up.  Patient was given instructions and counseling regarding his condition or for health maintenance advice. Please see specific information pulled into the AVS if appropriate.

## 2022-08-16 ENCOUNTER — OFFICE VISIT (OUTPATIENT)
Dept: CARDIOLOGY | Facility: CLINIC | Age: 82
End: 2022-08-16

## 2022-08-16 VITALS
SYSTOLIC BLOOD PRESSURE: 142 MMHG | WEIGHT: 254 LBS | HEART RATE: 60 BPM | DIASTOLIC BLOOD PRESSURE: 78 MMHG | HEIGHT: 73 IN | BODY MASS INDEX: 33.66 KG/M2 | OXYGEN SATURATION: 98 %

## 2022-08-16 DIAGNOSIS — E78.2 MIXED HYPERLIPIDEMIA: ICD-10-CM

## 2022-08-16 DIAGNOSIS — Z98.890 S/P ABLATION OF ATRIAL FIBRILLATION: ICD-10-CM

## 2022-08-16 DIAGNOSIS — Z95.818 PRESENCE OF WATCHMAN LEFT ATRIAL APPENDAGE CLOSURE DEVICE: ICD-10-CM

## 2022-08-16 DIAGNOSIS — Z95.5 STENTED CORONARY ARTERY: ICD-10-CM

## 2022-08-16 DIAGNOSIS — E66.09 CLASS 1 OBESITY DUE TO EXCESS CALORIES WITH SERIOUS COMORBIDITY AND BODY MASS INDEX (BMI) OF 33.0 TO 33.9 IN ADULT: ICD-10-CM

## 2022-08-16 DIAGNOSIS — I25.10 CORONARY ARTERY DISEASE INVOLVING NATIVE CORONARY ARTERY OF NATIVE HEART WITHOUT ANGINA PECTORIS: Primary | ICD-10-CM

## 2022-08-16 DIAGNOSIS — I50.32 CHRONIC DIASTOLIC CONGESTIVE HEART FAILURE: ICD-10-CM

## 2022-08-16 DIAGNOSIS — G47.33 OSA (OBSTRUCTIVE SLEEP APNEA): ICD-10-CM

## 2022-08-16 DIAGNOSIS — I45.10 RBBB: ICD-10-CM

## 2022-08-16 DIAGNOSIS — I51.7 LVH (LEFT VENTRICULAR HYPERTROPHY): ICD-10-CM

## 2022-08-16 DIAGNOSIS — I10 PRIMARY HYPERTENSION: ICD-10-CM

## 2022-08-16 DIAGNOSIS — I27.20 PULMONARY HYPERTENSION: ICD-10-CM

## 2022-08-16 DIAGNOSIS — Z86.79 S/P ABLATION OF ATRIAL FIBRILLATION: ICD-10-CM

## 2022-08-16 DIAGNOSIS — I48.0 PAF (PAROXYSMAL ATRIAL FIBRILLATION): ICD-10-CM

## 2022-08-16 PROCEDURE — 99214 OFFICE O/P EST MOD 30 MIN: CPT | Performed by: NURSE PRACTITIONER

## 2022-08-16 PROCEDURE — 93000 ELECTROCARDIOGRAM COMPLETE: CPT | Performed by: NURSE PRACTITIONER

## 2022-08-16 RX ORDER — SACUBITRIL AND VALSARTAN 24; 26 MG/1; MG/1
1 TABLET, FILM COATED ORAL 2 TIMES DAILY
Qty: 60 TABLET | Refills: 0 | Status: SHIPPED | OUTPATIENT
Start: 2022-08-16 | End: 2022-09-20 | Stop reason: DRUGHIGH

## 2022-08-16 RX ORDER — GUSELKUMAB 100 MG/ML
INJECTION SUBCUTANEOUS
COMMUNITY
End: 2022-09-20

## 2022-08-16 RX ORDER — AMLODIPINE BESYLATE 5 MG/1
5 TABLET ORAL DAILY
Qty: 90 TABLET | Refills: 3 | Status: SHIPPED | OUTPATIENT
Start: 2022-08-16 | End: 2022-08-16

## 2022-08-16 RX ORDER — SACUBITRIL AND VALSARTAN 24; 26 MG/1; MG/1
1 TABLET, FILM COATED ORAL 2 TIMES DAILY
Qty: 180 TABLET | Refills: 3 | Status: SHIPPED | OUTPATIENT
Start: 2022-08-16 | End: 2022-08-16 | Stop reason: SDUPTHER

## 2022-09-01 PROCEDURE — 93298 REM INTERROG DEV EVAL SCRMS: CPT | Performed by: INTERNAL MEDICINE

## 2022-09-01 PROCEDURE — G2066 INTER DEVC REMOTE 30D: HCPCS | Performed by: INTERNAL MEDICINE

## 2022-09-19 NOTE — PROGRESS NOTES
Chief Complaint  Congestive Heart Failure (4wk F/U. Started Entresto LOV) and Results (Lab)    Subjective          Fred Johnson presents to [facility] CARDIOLOGY [id number] for routine follow-up of medication adjustments.  Amlodipine and HCTZ were discontinued and he was started on Entresto 24/26 mg twice daily at his last office visit on 8/16/2022.  Follow-up BMP on 8/19/2022 revealed normal potassium and creatinine.  He has coronary artery disease status post stents x5 with most recent being in 2019, paroxysmal atrial fibrillation status post ablation and watchman left atrial appendage closure device in 2019, left ventricular hypertrophy, chronic diastolic congestive heart failure, pulmonary hypertension, hypertension, hyperlipidemia, right bundle branch block, obstructive sleep apnea and obesity. He complains of a six to eight month history of progressively worsening decreased stamina. He complains of chronic dyspnea with mild exertion, intermittent bilateral lower extremity edema and abdominal bloating that have improved since starting Entresto. Patient denies chest pain, palpitations, dizziness, syncope, orthopnea, or PND.  Patient denies any signs of bleeding.    Coronary Artery Disease  Presents for follow-up visit. Symptoms include shortness of breath. Pertinent negatives include no chest pain, chest pressure, chest tightness, dizziness, leg swelling, muscle weakness, palpitations or weight gain. Risk factors include hyperlipidemia and obesity. Risk factors do not include hypertension. His past medical history is significant for CHF. The symptoms have been stable. Compliance with diet is variable. Compliance with exercise is variable. Compliance with medications is good.   Atrial Fibrillation  Presents for follow-up visit. Symptoms include shortness of breath. Symptoms are negative for an AICD problem, bradycardia, chest pain, dizziness, hemodynamic instability, hypertension, hypotension,  pacemaker problem, palpitations, syncope, tachycardia and weakness. The symptoms have been stable. Past medical history includes atrial fibrillation, CAD, CHF and hyperlipidemia.   Hypertension  This is a chronic problem. The current episode started more than 1 year ago. The problem is uncontrolled. Associated symptoms include malaise/fatigue and shortness of breath. Pertinent negatives include no anxiety, blurred vision, chest pain, headaches, neck pain, orthopnea, palpitations, peripheral edema, PND or sweats. Risk factors for coronary artery disease include male gender, obesity and dyslipidemia. Current antihypertension treatment includes calcium channel blockers, diuretics and beta blockers. The current treatment provides significant improvement. Hypertensive end-organ damage includes CAD/MI, heart failure and left ventricular hypertrophy.   Hyperlipidemia  This is a chronic problem. The current episode started more than 1 year ago. Exacerbating diseases include obesity. Associated symptoms include shortness of breath. Pertinent negatives include no chest pain. Current antihyperlipidemic treatment includes statins. Risk factors for coronary artery disease include hypertension, male sex, obesity and dyslipidemia.   Congestive Heart Failure  Presents for follow-up visit. Associated symptoms include edema and shortness of breath. Pertinent negatives include no abdominal pain, chest pain, chest pressure, claudication, fatigue, muscle weakness, near-syncope, nocturia, orthopnea, palpitations, paroxysmal nocturnal dyspnea or unexpected weight change. The symptoms have been stable. His past medical history is significant for CAD. Compliance with total regimen is 51-75%. Compliance with diet is 51-75%. Compliance with exercise is 51-75%. Compliance with medications is %.     I have reviewed and confirmed the accuracy of the ROS  DANII Tsang      Objective     Current Outpatient Medications:   •   "aspirin 81 MG EC tablet, Take 81 mg by mouth Daily., Disp: , Rfl:   •  atorvastatin (LIPITOR) 40 MG tablet, Take 40 mg by mouth Daily., Disp: , Rfl:   •  metoprolol tartrate (LOPRESSOR) 25 MG tablet, Take 0.5 tablets by mouth 2 (Two) Times a Day., Disp: 90 tablet, Rfl: 3  •  nitroglycerin (NITROSTAT) 0.4 MG SL tablet, 1 under the tongue as needed for angina, may repeat q5mins for up three doses, Disp: 25 tablet, Rfl: 11  •  sacubitril-valsartan (Entresto) 49-51 MG tablet, Take 1 tablet by mouth 2 (Two) Times a Day., Disp: 180 tablet, Rfl: 3  Vital Signs:   /96   Pulse 56   Ht 185.4 cm (73\")   Wt 112 kg (248 lb)   SpO2 98%   BMI 32.72 kg/m²     Vitals and nursing note reviewed.   Constitutional:       General: Awake.      Appearance: Normal and healthy appearance. Well-developed and not in distress. Obese.   Eyes:      General: Lids are normal.      Conjunctiva/sclera: Conjunctivae normal.      Pupils: Pupils are equal, round, and reactive to light.   HENT:      Head: Normocephalic and atraumatic.      Nose: Nose normal.   Neck:      Vascular: No JVR. JVD normal.   Pulmonary:      Effort: Pulmonary effort is normal.      Breath sounds: Normal breath sounds. No decreased breath sounds. No wheezing. No rhonchi. No rales.   Chest:      Chest wall: Not tender to palpatation.   Cardiovascular:      PMI at left midclavicular line. Bradycardia present. Irregularly irregular rhythm. Normal S1. Normal S2.      Murmurs: There is no murmur.      No gallop. No click. No rub.   Pulses:     Intact distal pulses.   Edema:     Peripheral edema absent.   Abdominal:      General: Bowel sounds are normal.      Palpations: Abdomen is soft.      Tenderness: There is no abdominal tenderness.   Musculoskeletal: Normal range of motion.         General: No tenderness.      Cervical back: Normal range of motion. Skin:     General: Skin is warm and dry.   Neurological:      General: No focal deficit present.      Mental Status: " Alert, oriented to person, place, and time and oriented to person, place and time.   Psychiatric:         Attention and Perception: Attention and perception normal.         Mood and Affect: Mood and affect normal.         Speech: Speech normal.         Behavior: Behavior normal. Behavior is cooperative.         Thought Content: Thought content normal.         Cognition and Memory: Cognition and memory normal.         Judgment: Judgment normal.        Result Review :   The following data was reviewed by: DANII Tsang on 9/20/2022      Data reviewed: Cardiology studies 2d echo 2/16/22          Assessment and Plan    Diagnoses and all orders for this visit:    1. Coronary artery disease involving native coronary artery of native heart without angina pectoris (Primary)-no clinical signs of ischemia.  Stable.    2. Stented coronary artery-x5 with the most recent being in 2019.  Patient continues on aspirin.  Denies bleeding.    3. PAF (paroxysmal atrial fibrillation) (HCC)- status post ablation.  2% burden on Linq interrogation 7/21/22 and 7/29/22. In sinus rhythm on EKG today. Rate controlled. Stable.  Continue metoprolol tartrate.    4. S/P ablation of atrial fibrillation- 2019 in [place].     5. Presence of Watchman left atrial appendage closure device-2019.    6. LVH (left ventricular hypertrophy)-mild concentric hypertrophy on 2D echo 2/16/2022.  Stable.    7. Chronic diastolic congestive heart failure (HCC)-grade 2 left ventricular diastolic dysfunction on 2D echo 2/16/2022. NYHA class III.  Increase Entresto to 49/51 mg twice daily. BMP in one week. Reviewed signs and symptoms of CHF and what to report with the patient. Patient instructed to restrict sodium and weigh daily. Report weight gain of greater than 2 lbs overnight or 5 lbs in 1 week. Pt verbalized understanding of instructions and plan of care.  Continue metoprolol tartrate.  Consider up titration of Entresto and/or initiation of Jardiance in  the future, if tolerated.    8. Pulmonary hypertension (HCC)-mild on 2D echo 2/16/2022.  Stable.    9. Primary hypertension-blood pressure is elevated in office today.  Continue to monitor following above medication adjustments. Continue metoprolol tartrate.  Monitor and record daily blood pressure. Report readings consistently higher than 130/90 or consistently lower than 100/60.     10. Mixed hyperlipidemia-management per PCP.  Continue atorvastatin.    11. RBBB-chronic.  Stable.    12. Obstructive sleep apnea- pt is non-compliant with CPAP. He has been advised of potential health risks of untreated sleep apnea, including hypertension, atrial fibrillation, CHF and increased risk of stroke. He verbalized understanding.     13. Class 1 obesity due to excess calories with serious comorbidity and body mass index (BMI) of 32.0 to 32.9 in adult- BMI is >= 30 and <35. (Class 1 Obesity). The following options were offered after discussion;: weight loss educational material (shared in after visit summary).       Follow Up   Return in about 4 weeks (around 10/18/2022) for Next scheduled follow up.  Patient was given instructions and counseling regarding his condition or for health maintenance advice. Please see specific information pulled into the AVS if appropriate.

## 2022-09-20 ENCOUNTER — OFFICE VISIT (OUTPATIENT)
Dept: CARDIOLOGY | Facility: CLINIC | Age: 82
End: 2022-09-20

## 2022-09-20 VITALS
DIASTOLIC BLOOD PRESSURE: 96 MMHG | WEIGHT: 248 LBS | OXYGEN SATURATION: 98 % | HEART RATE: 56 BPM | BODY MASS INDEX: 32.87 KG/M2 | HEIGHT: 73 IN | SYSTOLIC BLOOD PRESSURE: 144 MMHG

## 2022-09-20 DIAGNOSIS — Z95.5 STENTED CORONARY ARTERY: ICD-10-CM

## 2022-09-20 DIAGNOSIS — I25.10 CORONARY ARTERY DISEASE INVOLVING NATIVE CORONARY ARTERY OF NATIVE HEART WITHOUT ANGINA PECTORIS: Primary | ICD-10-CM

## 2022-09-20 DIAGNOSIS — I48.0 PAF (PAROXYSMAL ATRIAL FIBRILLATION): ICD-10-CM

## 2022-09-20 DIAGNOSIS — I45.10 RBBB: ICD-10-CM

## 2022-09-20 DIAGNOSIS — G47.33 OSA (OBSTRUCTIVE SLEEP APNEA): ICD-10-CM

## 2022-09-20 DIAGNOSIS — E66.09 CLASS 1 OBESITY DUE TO EXCESS CALORIES WITH SERIOUS COMORBIDITY AND BODY MASS INDEX (BMI) OF 32.0 TO 32.9 IN ADULT: ICD-10-CM

## 2022-09-20 DIAGNOSIS — I27.20 PULMONARY HYPERTENSION: ICD-10-CM

## 2022-09-20 DIAGNOSIS — Z95.818 PRESENCE OF WATCHMAN LEFT ATRIAL APPENDAGE CLOSURE DEVICE: ICD-10-CM

## 2022-09-20 DIAGNOSIS — Z86.79 S/P ABLATION OF ATRIAL FIBRILLATION: ICD-10-CM

## 2022-09-20 DIAGNOSIS — Z98.890 S/P ABLATION OF ATRIAL FIBRILLATION: ICD-10-CM

## 2022-09-20 DIAGNOSIS — I10 PRIMARY HYPERTENSION: ICD-10-CM

## 2022-09-20 DIAGNOSIS — I50.32 CHRONIC DIASTOLIC CONGESTIVE HEART FAILURE: ICD-10-CM

## 2022-09-20 DIAGNOSIS — E78.2 MIXED HYPERLIPIDEMIA: ICD-10-CM

## 2022-09-20 DIAGNOSIS — I51.7 LVH (LEFT VENTRICULAR HYPERTROPHY): ICD-10-CM

## 2022-09-20 PROCEDURE — 99214 OFFICE O/P EST MOD 30 MIN: CPT | Performed by: NURSE PRACTITIONER

## 2022-09-20 RX ORDER — SACUBITRIL AND VALSARTAN 49; 51 MG/1; MG/1
1 TABLET, FILM COATED ORAL 2 TIMES DAILY
Qty: 180 TABLET | Refills: 3 | Status: SHIPPED | OUTPATIENT
Start: 2022-09-20 | End: 2022-10-07 | Stop reason: SDUPTHER

## 2022-10-02 PROCEDURE — G2066 INTER DEVC REMOTE 30D: HCPCS | Performed by: INTERNAL MEDICINE

## 2022-10-02 PROCEDURE — 93298 REM INTERROG DEV EVAL SCRMS: CPT | Performed by: INTERNAL MEDICINE

## 2022-10-07 RX ORDER — SACUBITRIL AND VALSARTAN 49; 51 MG/1; MG/1
1 TABLET, FILM COATED ORAL 2 TIMES DAILY
Qty: 180 TABLET | Refills: 3 | Status: SHIPPED | OUTPATIENT
Start: 2022-10-07 | End: 2022-11-07 | Stop reason: SDUPTHER

## 2022-11-02 PROCEDURE — 93298 REM INTERROG DEV EVAL SCRMS: CPT | Performed by: INTERNAL MEDICINE

## 2022-11-02 PROCEDURE — G2066 INTER DEVC REMOTE 30D: HCPCS | Performed by: INTERNAL MEDICINE

## 2022-11-07 ENCOUNTER — OFFICE VISIT (OUTPATIENT)
Dept: CARDIOLOGY | Facility: CLINIC | Age: 82
End: 2022-11-07

## 2022-11-07 VITALS
HEART RATE: 58 BPM | BODY MASS INDEX: 31.81 KG/M2 | HEIGHT: 73 IN | SYSTOLIC BLOOD PRESSURE: 162 MMHG | OXYGEN SATURATION: 96 % | WEIGHT: 240 LBS | DIASTOLIC BLOOD PRESSURE: 84 MMHG

## 2022-11-07 DIAGNOSIS — E66.09 CLASS 1 OBESITY DUE TO EXCESS CALORIES WITH SERIOUS COMORBIDITY AND BODY MASS INDEX (BMI) OF 32.0 TO 32.9 IN ADULT: ICD-10-CM

## 2022-11-07 DIAGNOSIS — Z86.79 S/P ABLATION OF ATRIAL FIBRILLATION: ICD-10-CM

## 2022-11-07 DIAGNOSIS — I50.32 CHRONIC DIASTOLIC CONGESTIVE HEART FAILURE: ICD-10-CM

## 2022-11-07 DIAGNOSIS — I10 PRIMARY HYPERTENSION: ICD-10-CM

## 2022-11-07 DIAGNOSIS — I25.10 CORONARY ARTERY DISEASE INVOLVING NATIVE CORONARY ARTERY OF NATIVE HEART WITHOUT ANGINA PECTORIS: Primary | ICD-10-CM

## 2022-11-07 DIAGNOSIS — I45.10 RBBB: ICD-10-CM

## 2022-11-07 DIAGNOSIS — E78.2 MIXED HYPERLIPIDEMIA: ICD-10-CM

## 2022-11-07 DIAGNOSIS — Z95.5 STENTED CORONARY ARTERY: ICD-10-CM

## 2022-11-07 DIAGNOSIS — G47.33 OSA (OBSTRUCTIVE SLEEP APNEA): ICD-10-CM

## 2022-11-07 DIAGNOSIS — I51.7 LVH (LEFT VENTRICULAR HYPERTROPHY): ICD-10-CM

## 2022-11-07 DIAGNOSIS — I27.20 PULMONARY HYPERTENSION: ICD-10-CM

## 2022-11-07 DIAGNOSIS — Z95.818 PRESENCE OF WATCHMAN LEFT ATRIAL APPENDAGE CLOSURE DEVICE: ICD-10-CM

## 2022-11-07 DIAGNOSIS — I48.0 PAF (PAROXYSMAL ATRIAL FIBRILLATION): ICD-10-CM

## 2022-11-07 DIAGNOSIS — Z98.890 S/P ABLATION OF ATRIAL FIBRILLATION: ICD-10-CM

## 2022-11-07 PROCEDURE — 99214 OFFICE O/P EST MOD 30 MIN: CPT | Performed by: NURSE PRACTITIONER

## 2022-11-07 RX ORDER — SPIRONOLACTONE 25 MG/1
25 TABLET ORAL DAILY
Qty: 90 TABLET | Refills: 3 | Status: SHIPPED | OUTPATIENT
Start: 2022-11-07 | End: 2022-11-14 | Stop reason: SDUPTHER

## 2022-11-07 RX ORDER — METOPROLOL SUCCINATE 25 MG/1
12.5 TABLET, EXTENDED RELEASE ORAL DAILY
Qty: 45 TABLET | Refills: 3 | Status: SHIPPED | OUTPATIENT
Start: 2022-11-07 | End: 2022-11-14 | Stop reason: SDUPTHER

## 2022-11-07 RX ORDER — SACUBITRIL AND VALSARTAN 49; 51 MG/1; MG/1
1 TABLET, FILM COATED ORAL 2 TIMES DAILY
Qty: 180 TABLET | Refills: 3 | Status: SHIPPED | OUTPATIENT
Start: 2022-11-07 | End: 2023-02-02

## 2022-11-07 NOTE — PROGRESS NOTES
Chief Complaint  Congestive Heart Failure (4wk F/U. Increased Entresto LOV) and Results (Lab)    Subjective          Fred Johnson presents to [facility] [id number] for routine follow-up of medication adjustments.  Entresto was increased to 49/51 mg twice daily at his last office visit on 9/20/22.  Follow-up BMP on 09/27/2022 revealed normal potassium and creatinine.  He has coronary artery disease status post stents x5 with most recent being in 2019, paroxysmal atrial fibrillation status post ablation and watchman left atrial appendage closure device in 2019, left ventricular hypertrophy, chronic diastolic congestive heart failure, pulmonary hypertension, hypertension, hyperlipidemia, right bundle branch block, obstructive sleep apnea and obesity. He reports improvement in stamina since decreasing metoprolol tartrate to 12.5 mg twice daily.  He complains of chronic dyspnea with mild exertion, intermittent bilateral lower extremity edema and abdominal bloating that have improved since starting Entresto. Patient denies chest pain, palpitations, dizziness, syncope, orthopnea, or PND.  Patient denies any signs of bleeding.    Coronary Artery Disease  Presents for follow-up visit. Symptoms include shortness of breath. Pertinent negatives include no chest pain, chest pressure, chest tightness, dizziness, leg swelling, muscle weakness, palpitations or weight gain. Risk factors include hyperlipidemia and obesity. Risk factors do not include hypertension. His past medical history is significant for CHF. The symptoms have been stable. Compliance with diet is variable. Compliance with exercise is variable. Compliance with medications is good.   Atrial Fibrillation  Presents for follow-up visit. Symptoms include shortness of breath. Symptoms are negative for an AICD problem, bradycardia, chest pain, dizziness, hemodynamic instability, hypertension, hypotension, pacemaker problem, palpitations,  syncope, tachycardia and weakness. The symptoms have been stable. Past medical history includes atrial fibrillation, CAD, CHF and hyperlipidemia.   Hypertension  This is a chronic problem. The current episode started more than 1 year ago. The problem is uncontrolled. Associated symptoms include malaise/fatigue and shortness of breath. Pertinent negatives include no anxiety, blurred vision, chest pain, headaches, neck pain, orthopnea, palpitations, peripheral edema, PND or sweats. Risk factors for coronary artery disease include male gender, obesity and dyslipidemia. Current antihypertension treatment includes calcium channel blockers, diuretics and beta blockers. The current treatment provides significant improvement. Hypertensive end-organ damage includes CAD/MI, heart failure and left ventricular hypertrophy.   Hyperlipidemia  This is a chronic problem. The current episode started more than 1 year ago. Exacerbating diseases include obesity. Associated symptoms include shortness of breath. Pertinent negatives include no chest pain. Current antihyperlipidemic treatment includes statins. Risk factors for coronary artery disease include hypertension, male sex, obesity and dyslipidemia.   Congestive Heart Failure  Presents for follow-up visit. Associated symptoms include edema and shortness of breath. Pertinent negatives include no abdominal pain, chest pain, chest pressure, claudication, fatigue, muscle weakness, near-syncope, nocturia, orthopnea, palpitations, paroxysmal nocturnal dyspnea or unexpected weight change. The symptoms have been stable. His past medical history is significant for CAD. Compliance with total regimen is 51-75%. Compliance with diet is 51-75%. Compliance with exercise is 51-75%. Compliance with medications is %.     I have reviewed and confirmed the accuracy of the ROS  DANII Tsang        Objective     Current Outpatient Medications:   •  aspirin 81 MG EC tablet, Take 81 mg  "by mouth Daily., Disp: , Rfl:   •  atorvastatin (LIPITOR) 40 MG tablet, Take 40 mg by mouth Daily., Disp: , Rfl:   •  nitroglycerin (NITROSTAT) 0.4 MG SL tablet, 1 under the tongue as needed for angina, may repeat q5mins for up three doses, Disp: 25 tablet, Rfl: 11  •  sacubitril-valsartan (Entresto) 49-51 MG tablet, Take 1 tablet by mouth 2 (Two) Times a Day., Disp: 180 tablet, Rfl: 3  •  metoprolol succinate XL (TOPROL-XL) 25 MG 24 hr tablet, Take 0.5 tablets by mouth Daily., Disp: 45 tablet, Rfl: 3  •  spironolactone (ALDACTONE) 25 MG tablet, Take 1 tablet by mouth Daily., Disp: 90 tablet, Rfl: 3  Vital Signs:   /84   Pulse 58   Ht 185.4 cm (73\")   Wt 109 kg (240 lb)   SpO2 96%   BMI 31.66 kg/m²     Vitals and nursing note reviewed.   Constitutional:       General: Awake.      Appearance: Normal and healthy appearance. Well-developed and not in distress. Obese.   Eyes:      General: Lids are normal.      Conjunctiva/sclera: Conjunctivae normal.      Pupils: Pupils are equal, round, and reactive to light.   HENT:      Head: Normocephalic and atraumatic.      Nose: Nose normal.   Neck:      Vascular: No JVR. JVD normal.   Pulmonary:      Effort: Pulmonary effort is normal.      Breath sounds: Normal breath sounds. No decreased breath sounds. No wheezing. No rhonchi. No rales.   Chest:      Chest wall: Not tender to palpatation.   Cardiovascular:      PMI at left midclavicular line. Bradycardia present. Irregularly irregular rhythm. Normal S1. Normal S2.      Murmurs: There is no murmur.      No gallop. No click. No rub.   Pulses:     Intact distal pulses.   Edema:     Peripheral edema absent.   Abdominal:      General: Bowel sounds are normal.      Palpations: Abdomen is soft.      Tenderness: There is no abdominal tenderness.   Musculoskeletal: Normal range of motion.         General: No tenderness.      Cervical back: Normal range of motion. Skin:     General: Skin is warm and dry.   Neurological:      " General: No focal deficit present.      Mental Status: Alert, oriented to person, place, and time and oriented to person, place and time.   Psychiatric:         Attention and Perception: Attention and perception normal.         Mood and Affect: Mood and affect normal.         Speech: Speech normal.         Behavior: Behavior normal. Behavior is cooperative.         Thought Content: Thought content normal.         Cognition and Memory: Cognition and memory normal.         Judgment: Judgment normal.        Result Review :   The following data was reviewed by: DANII Tsang on 11/07/2022      Data reviewed: Cardiology studies 2d echo 2/16/22          Assessment and Plan    Diagnoses and all orders for this visit:    1. Coronary artery disease involving native coronary artery of native heart without angina pectoris (Primary)-no clinical signs of ischemia.  Stable.    2. Stented coronary artery-x5 with the most recent being in 2019.  Patient continues on aspirin.  Denies bleeding.    3. PAF (paroxysmal atrial fibrillation) (HCC)- status post ablation.  2% burden on Linq interrogation 7/21/22 and 7/29/22. In sinus rhythm on EKG today. Rate controlled. Stable.  Continue metoprolol tartrate.    4. S/P ablation of atrial fibrillation- 2019 in [place].     5. Presence of Watchman left atrial appendage closure device-2019.    6. LVH (left ventricular hypertrophy)-mild concentric hypertrophy on 2D echo 2/16/2022.  Stable.    7. Chronic diastolic congestive heart failure (HCC)-grade 2 left ventricular diastolic dysfunction on 2D echo 2/16/2022. NYHA class III.  Stage C. Start spironolactone 25 mg daily. Change metoprolol tartrate to metoprolol succinate 12.5 mg daily. BMP in one week. Reviewed signs and symptoms of CHF and what to report with the patient. Patient instructed to restrict sodium and weigh daily. Report weight gain of greater than 2 lbs overnight or 5 lbs in 1 week. Pt verbalized understanding of  instructions and plan of care.  Continue Entresto.  Consider up titration of Entresto and/or initiation of Jardiacne in the future, if tolerated.    8. Pulmonary hypertension (HCC)-mild on 2D echo 2/16/2022.  Likely group 2 related to left sided heart disease. Stable.    9. Primary hypertension-blood pressure is markedly elevated in office today.  Continue to monitor following above medication adjustments. Continue Entresto.  Monitor and record daily blood pressure. Report readings consistently higher than 130/90 or consistently lower than 100/60.     10. Mixed hyperlipidemia-management per PCP.  Continue atorvastatin.    11. RBBB-chronic.  Stable.    12. Obstructive sleep apnea- pt is non-compliant with CPAP. He has been advised of potential health risks of untreated sleep apnea, including hypertension, atrial fibrillation, CHF and increased risk of stroke. He verbalized understanding.     13. Class 1 obesity due to excess calories with serious comorbidity and body mass index (BMI) of 31.0 to 31.9 in adult- BMI is >= 30 and <35. (Class 1 Obesity). The following options were offered after discussion;: weight loss educational material (shared in after visit summary).       Follow Up   Return in about 4 weeks (around 12/5/2022) for Next scheduled follow up.  Patient was given instructions and counseling regarding his condition or for health maintenance advice. Please see specific information pulled into the AVS if appropriate.

## 2022-11-09 ENCOUNTER — TELEPHONE (OUTPATIENT)
Dept: CARDIOLOGY | Facility: CLINIC | Age: 82
End: 2022-11-09

## 2022-11-09 RX ORDER — NITROGLYCERIN 0.4 MG/1
TABLET SUBLINGUAL
Qty: 25 TABLET | Refills: 11 | Status: CANCELLED | OUTPATIENT
Start: 2022-11-09

## 2022-11-09 RX ORDER — SPIRONOLACTONE 25 MG/1
25 TABLET ORAL DAILY
Qty: 90 TABLET | Refills: 3 | Status: CANCELLED | OUTPATIENT
Start: 2022-11-09

## 2022-11-09 RX ORDER — METOPROLOL SUCCINATE 25 MG/1
12.5 TABLET, EXTENDED RELEASE ORAL DAILY
Qty: 45 TABLET | Refills: 3 | Status: CANCELLED | OUTPATIENT
Start: 2022-11-09

## 2022-11-09 RX ORDER — NITROGLYCERIN 0.4 MG/1
TABLET SUBLINGUAL
Qty: 25 TABLET | Refills: 11 | Status: SHIPPED | OUTPATIENT
Start: 2022-11-09 | End: 2022-11-14 | Stop reason: SDUPTHER

## 2022-11-09 NOTE — TELEPHONE ENCOUNTER
Caller: Fred Johnson    Relationship: Self    Best call back number: [phone]    Requested Prescriptions:   Requested Prescriptions     Pending Prescriptions Disp Refills   • metoprolol succinate XL (TOPROL-XL) 25 MG 24 hr tablet 45 tablet 3     Sig: Take 0.5 tablets by mouth Daily.   • nitroglycerin (NITROSTAT) 0.4 MG SL tablet 25 tablet 11     Si under the tongue as needed for angina, may repeat q5mins for up three doses   • spironolactone (ALDACTONE) 25 MG tablet 90 tablet 3     Sig: Take 1 tablet by mouth Daily.        Pharmacy where request should be sent: [place] [facility] [place] - [address] [phone] [facility] [phone] FX       Does the patient have less than a 3 day supply:  [] Yes  [x] No    Moody Luther Rep   22 10:19 CST

## 2022-11-14 ENCOUNTER — DOCUMENTATION (OUTPATIENT)
Dept: CARDIOLOGY | Facility: CLINIC | Age: 82
End: 2022-11-14

## 2022-11-14 RX ORDER — NITROGLYCERIN 0.4 MG/1
TABLET SUBLINGUAL
Qty: 25 TABLET | Refills: 11 | Status: SHIPPED | OUTPATIENT
Start: 2022-11-14

## 2022-11-14 RX ORDER — SPIRONOLACTONE 25 MG/1
25 TABLET ORAL DAILY
Qty: 90 TABLET | Refills: 3 | Status: SHIPPED | OUTPATIENT
Start: 2022-11-14

## 2022-11-14 RX ORDER — METOPROLOL SUCCINATE 25 MG/1
12.5 TABLET, EXTENDED RELEASE ORAL DAILY
Qty: 45 TABLET | Refills: 3 | Status: SHIPPED | OUTPATIENT
Start: 2022-11-14

## 2022-12-03 PROCEDURE — G2066 INTER DEVC REMOTE 30D: HCPCS | Performed by: INTERNAL MEDICINE

## 2022-12-03 PROCEDURE — 93298 REM INTERROG DEV EVAL SCRMS: CPT | Performed by: INTERNAL MEDICINE

## 2023-01-03 PROCEDURE — G2066 INTER DEVC REMOTE 30D: HCPCS | Performed by: INTERNAL MEDICINE

## 2023-01-03 PROCEDURE — 93298 REM INTERROG DEV EVAL SCRMS: CPT | Performed by: INTERNAL MEDICINE

## 2023-02-02 ENCOUNTER — OFFICE VISIT (OUTPATIENT)
Dept: CARDIOLOGY | Facility: CLINIC | Age: 83
End: 2023-02-02
Payer: MEDICARE

## 2023-02-02 VITALS
WEIGHT: 257 LBS | SYSTOLIC BLOOD PRESSURE: 148 MMHG | OXYGEN SATURATION: 95 % | HEIGHT: 73 IN | HEART RATE: 62 BPM | DIASTOLIC BLOOD PRESSURE: 75 MMHG | BODY MASS INDEX: 34.06 KG/M2

## 2023-02-02 DIAGNOSIS — I50.32 CHRONIC DIASTOLIC CONGESTIVE HEART FAILURE: Primary | ICD-10-CM

## 2023-02-02 PROCEDURE — 99214 OFFICE O/P EST MOD 30 MIN: CPT | Performed by: NURSE PRACTITIONER

## 2023-02-02 RX ORDER — SACUBITRIL AND VALSARTAN 97; 103 MG/1; MG/1
1 TABLET, FILM COATED ORAL 2 TIMES DAILY
Qty: 60 TABLET | Refills: 11 | Status: SHIPPED | OUTPATIENT
Start: 2023-02-02

## 2023-02-02 RX ORDER — SACUBITRIL AND VALSARTAN 97; 103 MG/1; MG/1
1 TABLET, FILM COATED ORAL 2 TIMES DAILY
Qty: 60 TABLET | Refills: 11 | Status: SHIPPED | OUTPATIENT
Start: 2023-02-02 | End: 2023-02-02 | Stop reason: SDUPTHER

## 2023-02-02 NOTE — PROGRESS NOTES
Subjective:     Encounter Date: 02/02/2023      Patient ID: Fred Johnson is a 82 y.o. male with coronary artery disease status post stents x5 with most recent being in 2019, paroxysmal atrial fibrillation status post ablation and watchman left atrial appendage closure device in 2019, left ventricular hypertrophy, chronic diastolic congestive heart failure, pulmonary hypertension, hypertension, hyperlipidemia, right bundle branch block, obstructive sleep apnea and obesity.    Chief Complaint: 3 month follow up  Coronary Artery Disease  Presents for follow-up visit. Symptoms include shortness of breath. Pertinent negatives include no chest pain, chest pressure, chest tightness, dizziness, leg swelling, muscle weakness, palpitations or weight gain. Risk factors do not include hypertension. His past medical history is significant for CHF. The symptoms have been stable. Compliance with diet is good. Compliance with exercise is good. Compliance with medications is good.   Congestive Heart Failure  Presents for follow-up visit. Associated symptoms include shortness of breath. Pertinent negatives include no chest pain, chest pressure, claudication, edema, fatigue, muscle weakness, near-syncope, nocturia, orthopnea, palpitations or paroxysmal nocturnal dyspnea. His past medical history is significant for CAD. Compliance with total regimen is 51-75%. Compliance with diet is 26-50%. Compliance with exercise is 26-50%. Compliance with medications is 51-75%.   Hypertension  This is a chronic problem. The current episode started more than 1 year ago. The problem is controlled. Associated symptoms include shortness of breath. Pertinent negatives include no chest pain, malaise/fatigue, orthopnea, palpitations, peripheral edema or PND. Risk factors for coronary artery disease include dyslipidemia, obesity and male gender. Current antihypertension treatment includes diuretics and angiotensin blockers. Hypertensive end-organ  damage includes CAD/MI and heart failure. Identifiable causes of hypertension include sleep apnea.   Atrial Fibrillation  Presents for follow-up visit. Symptoms include shortness of breath. Symptoms are negative for bradycardia, chest pain, dizziness, hemodynamic instability, hypertension, hypotension, palpitations, syncope, tachycardia and weakness. The symptoms have been stable. Past medical history includes atrial fibrillation, CAD and CHF. There are no medication compliance problems.     Patient presents today for management of congestive heart failure. LOV patient was started on Spironolactone and had a BMP 11/29/2022 that showed normal creatine and potassium levels. Patient reports that he has done well since LOV. He reports that if he gets excited or talking a lot he gets some shortness of breath. He denies any chest pain. He denies any dyspnea on exertion. Patient denies any leg swelling, orthopnea or PND. He denies monitoring his daily weights. He reports that his blood pressure at home is running 138-142/70-80 heart rate is 67-73. He denies any heart racing or palpitations. He denies any bleeding issues. He denies any dizziness or near syncope. Patient follows with Dr Perkins as PCP.     The following portions of the patient's history were reviewed and updated as appropriate: allergies, current medications, past family history, past medical history, past social history, past surgical history and problem list.    Allergies   Allergen Reactions   • Percocet [Oxycodone-Acetaminophen] Nausea And Vomiting       Current Outpatient Medications:   •  aspirin 81 MG EC tablet, Take 81 mg by mouth Daily., Disp: , Rfl:   •  atorvastatin (LIPITOR) 40 MG tablet, Take 40 mg by mouth Daily., Disp: , Rfl:   •  metoprolol succinate XL (TOPROL-XL) 25 MG 24 hr tablet, Take 0.5 tablets by mouth Daily., Disp: 45 tablet, Rfl: 3  •  nitroglycerin (NITROSTAT) 0.4 MG SL tablet, 1 under the tongue as needed for angina, may repeat  q5mins for up three doses, Disp: 25 tablet, Rfl: 11  •  spironolactone (ALDACTONE) 25 MG tablet, Take 1 tablet by mouth Daily., Disp: 90 tablet, Rfl: 3  •  sacubitril-valsartan (Entresto)  MG tablet, Take 1 tablet by mouth 2 (Two) Times a Day., Disp: 60 tablet, Rfl: 11  Past Medical History:   Diagnosis Date   • Coronary artery disease      Social History     Socioeconomic History   • Marital status:    Tobacco Use   • Smoking status: Never   • Smokeless tobacco: Never   Vaping Use   • Vaping Use: Never used   Substance and Sexual Activity   • Alcohol use: Yes     Comment: occ   • Drug use: Never   • Sexual activity: Defer       Review of Systems   Constitutional: Negative for fatigue, malaise/fatigue and weight gain.   HENT: Negative for nosebleeds.    Cardiovascular: Negative for chest pain, claudication, dyspnea on exertion, irregular heartbeat, leg swelling, near-syncope, orthopnea, palpitations, paroxysmal nocturnal dyspnea and syncope.   Respiratory: Positive for shortness of breath. Negative for chest tightness.    Hematologic/Lymphatic: Bruises/bleeds easily.   Musculoskeletal: Negative for muscle weakness.   Genitourinary: Negative for hematuria and nocturia.   Neurological: Negative for dizziness and weakness.   All other systems reviewed and are negative.         Objective:     Vitals reviewed.   Constitutional:       General: Not in acute distress.     Appearance: Normal appearance. Well-developed. Obese.   Eyes:      Pupils: Pupils are equal, round, and reactive to light.   HENT:      Head: Normocephalic and atraumatic.      Nose: Nose normal.   Neck:      Vascular: No carotid bruit.   Pulmonary:      Effort: Pulmonary effort is normal. No respiratory distress.      Breath sounds: Normal breath sounds. No wheezing. No rales.   Cardiovascular:      Normal rate. Regular rhythm.      Murmurs: There is a grade 2/6 systolic murmur.   Edema:     Peripheral edema absent.   Abdominal:      General:  "There is no distension.      Palpations: Abdomen is soft.   Musculoskeletal: Normal range of motion.      Cervical back: Normal range of motion and neck supple. Skin:     General: Skin is warm.      Findings: No erythema or rash.   Neurological:      General: No focal deficit present.      Mental Status: Alert and oriented to person, place, and time.   Psychiatric:         Attention and Perception: Attention normal.         Mood and Affect: Mood normal.         Speech: Speech normal.         Behavior: Behavior normal.         Thought Content: Thought content normal.         Judgment: Judgment normal.         /75   Pulse 62   Ht 185.4 cm (72.99\")   Wt 117 kg (257 lb)   SpO2 95%   BMI 33.91 kg/m²     Procedures    Lab Review:     Linq interrogation 12/08/2022:      Results for orders placed during the hospital encounter of 02/16/22    Adult Transthoracic Echo Complete w/ Color, Spectral and Contrast if necessary per protocol    Interpretation Summary  · Left ventricular wall thickness is consistent with mild concentric hypertrophy.  · Left ventricular ejection fraction appears to be 61 - 65%. Left ventricular systolic function is normal.  · Abnormal global longitudinal LV strain (GLS) = -12.0%.  · Left ventricular diastolic function is consistent with (grade II w/high LAP) pseudonormalization.  · Left atrial volume is mildly increased.  · Mild pulmonary hypertension is present.    No results found for: CHOL, CHLPL, TRIG, HDL, LDL, LDLDIRECT      I have personally reviewed   Assessment:          Diagnosis Plan   1. Chronic diastolic congestive heart failure (HCC)  Basic Metabolic Panel             Plan:       1. CAD: s/p stents x5 with most recent being in 2019. Patient remains chest pain free. Continue aspirin, atorvastatin and metoprolol.     2. PAF: s/p ablation and watchman left atrial appendage closure device in 2019. 5 % burden on Linq interrogation from 12/08/2022. Continue metoprolol.     3. " Watchman: left atrial appendage closure device placed in 2019. Continue aspirin    4. Chronic diastolic congestive heart failure: grade 2 diastolic dysfunction on echo from 2/16/2022. NYHA class III. Stage C. Euvolemic on examination today. Will titrate Entresto up to 97/103 mg. Will get BMP in 1 week after starting increased dose. Continue  spironolactone and metoprolol. Reviewed signs and symptoms of CHF and what to report to office with patient. Patient instructed to restrict sodium and record daily weight. Patient is to report weight gain of greater than 2 lbs overnight or 5 lbs in 1 week. Patient verbalized understanding of instructions and plan of care.     5. Hypertension: initially elevated in office. Patient reports better control at home. Will increase Entresto and continue to monitor    6. Hyperlipidemia: managed and followed by [facility] office; will request labs    7. Pulmonary hypertension: mild on echo from 2/16/2022. Stable    8. Obstructive sleep apnea: Non compliant with CPAP.     9. Obesity:BMI is >= 30 and <35. (Class 1 Obesity). The following options were offered after discussion;: exercise counseling/recommendations and nutrition counseling/recommendations    I spent 37 minutes caring for Fred on this date of service. This time includes time spent by me in the following activities:preparing for the visit, reviewing tests, obtaining and/or reviewing a separately obtained history, performing a medically appropriate examination and/or evaluation , counseling and educating the patient/family/caregiver, ordering medications, tests, or procedures and documenting information in the medical record     Patient is to follow up in 3 months  or sooner if needed

## 2023-03-06 PROCEDURE — 93298 REM INTERROG DEV EVAL SCRMS: CPT | Performed by: INTERNAL MEDICINE

## 2023-03-06 PROCEDURE — G2066 INTER DEVC REMOTE 30D: HCPCS | Performed by: INTERNAL MEDICINE

## 2023-05-16 NOTE — PROGRESS NOTES
Chief Complaint  Congestive Heart Failure (3mo F/U. Increased Entresto LOV)    Subjective          Fred Johnson presents to [id number] for routine follow-up of medication adjustments.  Entresto was increased to 97/103 mg twice daily at his last office visit on 02/02/2023.  Follow-up BMP has not been completed.  He has coronary artery disease status post stents x5 with most recent being in 2019, paroxysmal atrial fibrillation status post ablation and watchman left atrial appendage closure device in 2019, left ventricular hypertrophy, chronic diastolic congestive heart failure, pulmonary hypertension, hypertension, hyperlipidemia, right bundle branch block, obstructive sleep apnea and obesity. He reports improvement in stamina since decreasing metoprolol tartrate to 12.5 mg twice daily.  He complains of chronic dyspnea with mild exertion, intermittent bilateral lower extremity edema and abdominal bloating that have improved since starting Entresto. Patient denies chest pain, palpitations, dizziness, syncope, orthopnea, or PND.  Patient denies any signs of bleeding.    Coronary Artery Disease  Presents for follow-up visit. Symptoms include shortness of breath. Pertinent negatives include no chest pain, chest pressure, chest tightness, dizziness, leg swelling, muscle weakness, palpitations or weight gain. Risk factors include hyperlipidemia and obesity. Risk factors do not include hypertension. His past medical history is significant for CHF. The symptoms have been stable. Compliance with diet is variable. Compliance with exercise is variable. Compliance with medications is good.   Atrial Fibrillation  Presents for follow-up visit. Symptoms include shortness of breath. Symptoms are negative for an AICD problem, bradycardia, chest pain, dizziness, hemodynamic instability, hypertension, hypotension, pacemaker problem, palpitations, syncope, tachycardia and weakness. The symptoms have  been stable. Past medical history includes atrial fibrillation, CAD, CHF and hyperlipidemia.   Hypertension  This is a chronic problem. The current episode started more than 1 year ago. The problem is uncontrolled. Associated symptoms include malaise/fatigue and shortness of breath. Pertinent negatives include no anxiety, blurred vision, chest pain, headaches, neck pain, orthopnea, palpitations, peripheral edema, PND or sweats. Risk factors for coronary artery disease include male gender, obesity and dyslipidemia. Current antihypertension treatment includes calcium channel blockers, diuretics and beta blockers. The current treatment provides significant improvement. Hypertensive end-organ damage includes CAD/MI, heart failure and left ventricular hypertrophy.   Hyperlipidemia  This is a chronic problem. The current episode started more than 1 year ago. Exacerbating diseases include obesity. Associated symptoms include shortness of breath. Pertinent negatives include no chest pain. Current antihyperlipidemic treatment includes statins. Risk factors for coronary artery disease include hypertension, male sex, obesity and dyslipidemia.   Congestive Heart Failure  Presents for follow-up visit. Associated symptoms include edema and shortness of breath. Pertinent negatives include no abdominal pain, chest pain, chest pressure, claudication, fatigue, muscle weakness, near-syncope, nocturia, orthopnea, palpitations, paroxysmal nocturnal dyspnea or unexpected weight change. The symptoms have been stable. His past medical history is significant for CAD. Compliance with total regimen is 51-75%. Compliance with diet is 51-75%. Compliance with exercise is 51-75%. Compliance with medications is %.     I have reviewed and confirmed the accuracy of the ROS  DANII Tsang        Objective     Current Outpatient Medications:     aspirin 81 MG EC tablet, Take 1 tablet by mouth Daily., Disp: , Rfl:     atorvastatin  "(LIPITOR) 40 MG tablet, Take 1 tablet by mouth Daily., Disp: , Rfl:     Cholecalciferol 25 MCG (1000 UT) tablet, Take 1 tablet by mouth Daily., Disp: , Rfl:     metoprolol succinate XL (TOPROL-XL) 25 MG 24 hr tablet, Take 0.5 tablets by mouth Daily., Disp: 45 tablet, Rfl: 3    nitroglycerin (NITROSTAT) 0.4 MG SL tablet, 1 under the tongue as needed for angina, may repeat q5mins for up three doses, Disp: 25 tablet, Rfl: 11    sacubitril-valsartan (Entresto)  MG tablet, Take 1 tablet by mouth 2 (Two) Times a Day., Disp: 60 tablet, Rfl: 11    spironolactone (ALDACTONE) 50 MG tablet, Take 1 tablet by mouth Daily., Disp: 90 tablet, Rfl: 3    Tildrakizumab-asmn (IIUMYA) 100 MG/ML injection, Inject 1 mL under the skin into the appropriate area as directed Every 3 (Three) Months., Disp: , Rfl:   Vital Signs:   /74   Pulse 50   Ht 185.4 cm (73\")   Wt 108 kg (239 lb)   SpO2 98%   BMI 31.53 kg/m²     Vitals and nursing note reviewed.   Constitutional:       General: Awake.      Appearance: Normal and healthy appearance. Well-developed and not in distress. Obese.   Eyes:      General: Lids are normal.      Conjunctiva/sclera: Conjunctivae normal.      Pupils: Pupils are equal, round, and reactive to light.   HENT:      Head: Normocephalic and atraumatic.      Nose: Nose normal.   Neck:      Vascular: No JVR. JVD normal.   Pulmonary:      Effort: Pulmonary effort is normal.      Breath sounds: Normal breath sounds. No decreased breath sounds. No wheezing. No rhonchi. No rales.   Chest:      Chest wall: Not tender to palpatation.   Cardiovascular:      PMI at left midclavicular line. Bradycardia present. Irregularly irregular rhythm. Normal S1. Normal S2.       Murmurs: There is no murmur.      No gallop.  No click. No rub.   Pulses:     Intact distal pulses.   Edema:     Peripheral edema absent.   Abdominal:      General: Bowel sounds are normal.      Palpations: Abdomen is soft.      Tenderness: There is no " abdominal tenderness.   Musculoskeletal: Normal range of motion.         General: No tenderness.      Cervical back: Normal range of motion. Skin:     General: Skin is warm and dry.   Neurological:      General: No focal deficit present.      Mental Status: Alert, oriented to person, place, and time and oriented to person, place and time.   Psychiatric:         Attention and Perception: Attention and perception normal.         Mood and Affect: Mood and affect normal.         Speech: Speech normal.         Behavior: Behavior normal. Behavior is cooperative.         Thought Content: Thought content normal.         Cognition and Memory: Cognition and memory normal.         Judgment: Judgment normal.      Result Review :   The following data was reviewed by: DANII Tsang on 05/18/2023      Data reviewed: Cardiology studies 2d echo 2/16/22      ECG 12 Lead    Date/Time: 5/18/2023 9:38 AM  Performed by: Brianda Loja APRN  Authorized by: Brianda Loja APRN   Comparison: compared with previous ECG from 8/16/2022  Rhythm: sinus bradycardia  Rate: bradycardic  BPM: 50  Conduction: right bundle branch block and 1st degree AV block    Clinical impression: abnormal EKG          Assessment and Plan    Diagnoses and all orders for this visit:    1. Coronary artery disease involving native coronary artery of native heart without angina pectoris (Primary)-no clinical signs of ischemia.  Stable.    2. Stented coronary artery-x5 with the most recent being in 2019.  Patient continues on aspirin.  Denies bleeding.    3. PAF (paroxysmal atrial fibrillation) ([facility])- status post ablation.  2% burden on Linq interrogation 7/21/22 and 7/29/22. In sinus rhythm on EKG today. Rate controlled. Stable.  Continue metoprolol tartrate.    4. S/P ablation of atrial fibrillation- 2019 in [place].     5. Presence of Watchman left atrial appendage closure device-2019.    6. LVH (left ventricular hypertrophy)-mild concentric  hypertrophy on 2D echo 2/16/2022.  Stable.    7. Chronic diastolic congestive heart failure (HCC)-grade 2 left ventricular diastolic dysfunction on 2D echo 2/16/2022. NYHA class III.  Stage C. Increase spironolactone to 50 mg daily.  BMP in one week.  Reviewed signs and symptoms of CHF and what to report with the patient. Patient instructed to restrict sodium and weigh daily. Report weight gain of greater than 2 lbs overnight or 5 lbs in 1 week. Pt verbalized understanding of instructions and plan of care.  Continue Entresto.  Consider initiation of Jardiance in the future, if tolerated.    8. Pulmonary hypertension (HCC)-mild on 2D echo 2/16/2022.  Likely group 2 related to left sided heart disease. Stable.    9. Primary hypertension-blood pressure is well controlled.  Continue to monitor following above medication adjustments. Continue Entresto.  Monitor and record daily blood pressure. Report readings consistently higher than 130/90 or consistently lower than 100/60.     10. Mixed hyperlipidemia-management per PCP.  Continue atorvastatin.    11. RBBB-chronic.  Stable.    12. Obstructive sleep apnea- pt is non-compliant with CPAP. He has been advised of potential health risks of untreated sleep apnea, including hypertension, atrial fibrillation, CHF and increased risk of stroke. He verbalized understanding.     13. Class 1 obesity due to excess calories with serious comorbidity and body mass index (BMI) of 31.0 to 31.9 in adult- BMI is >= 30 and <35. (Class 1 Obesity). The following options were offered after discussion;: weight loss educational material (shared in after visit summary).       Follow Up   Return in about 4 weeks (around 6/15/2023) for Next scheduled follow up.  Patient was given instructions and counseling regarding his condition or for health maintenance advice. Please see specific information pulled into the AVS if appropriate.

## 2023-05-18 ENCOUNTER — OFFICE VISIT (OUTPATIENT)
Dept: CARDIOLOGY | Facility: CLINIC | Age: 83
End: 2023-05-18
Payer: MEDICARE

## 2023-05-18 VITALS
WEIGHT: 239 LBS | BODY MASS INDEX: 31.68 KG/M2 | HEART RATE: 50 BPM | DIASTOLIC BLOOD PRESSURE: 74 MMHG | SYSTOLIC BLOOD PRESSURE: 128 MMHG | OXYGEN SATURATION: 98 % | HEIGHT: 73 IN

## 2023-05-18 DIAGNOSIS — I25.10 CORONARY ARTERY DISEASE INVOLVING NATIVE CORONARY ARTERY OF NATIVE HEART WITHOUT ANGINA PECTORIS: Primary | ICD-10-CM

## 2023-05-18 DIAGNOSIS — I45.10 RBBB: ICD-10-CM

## 2023-05-18 DIAGNOSIS — G47.33 OSA (OBSTRUCTIVE SLEEP APNEA): ICD-10-CM

## 2023-05-18 DIAGNOSIS — I51.7 LVH (LEFT VENTRICULAR HYPERTROPHY): ICD-10-CM

## 2023-05-18 DIAGNOSIS — Z95.818 PRESENCE OF WATCHMAN LEFT ATRIAL APPENDAGE CLOSURE DEVICE: ICD-10-CM

## 2023-05-18 DIAGNOSIS — E66.09 CLASS 1 OBESITY DUE TO EXCESS CALORIES WITH SERIOUS COMORBIDITY AND BODY MASS INDEX (BMI) OF 32.0 TO 32.9 IN ADULT: ICD-10-CM

## 2023-05-18 DIAGNOSIS — Z86.79 S/P ABLATION OF ATRIAL FIBRILLATION: ICD-10-CM

## 2023-05-18 DIAGNOSIS — I10 PRIMARY HYPERTENSION: ICD-10-CM

## 2023-05-18 DIAGNOSIS — E78.2 MIXED HYPERLIPIDEMIA: ICD-10-CM

## 2023-05-18 DIAGNOSIS — Z95.5 STENTED CORONARY ARTERY: ICD-10-CM

## 2023-05-18 DIAGNOSIS — Z98.890 S/P ABLATION OF ATRIAL FIBRILLATION: ICD-10-CM

## 2023-05-18 DIAGNOSIS — I27.20 PULMONARY HYPERTENSION: ICD-10-CM

## 2023-05-18 DIAGNOSIS — I48.0 PAF (PAROXYSMAL ATRIAL FIBRILLATION): ICD-10-CM

## 2023-05-18 DIAGNOSIS — I50.32 CHRONIC DIASTOLIC CONGESTIVE HEART FAILURE: ICD-10-CM

## 2023-05-18 RX ORDER — SPIRONOLACTONE 50 MG/1
50 TABLET, FILM COATED ORAL DAILY
Qty: 90 TABLET | Refills: 3 | Status: SHIPPED | OUTPATIENT
Start: 2023-05-18

## 2023-05-18 RX ORDER — MELATONIN
1000 DAILY
COMMUNITY

## 2023-05-18 RX ORDER — SPIRONOLACTONE 50 MG/1
50 TABLET, FILM COATED ORAL DAILY
Qty: 90 TABLET | Refills: 3 | Status: SHIPPED | OUTPATIENT
Start: 2023-05-18 | End: 2023-05-18 | Stop reason: SDUPTHER

## 2023-08-01 ENCOUNTER — OFFICE VISIT (OUTPATIENT)
Dept: CARDIOLOGY | Facility: CLINIC | Age: 83
End: 2023-08-01
Payer: MEDICARE

## 2023-08-01 VITALS
WEIGHT: 233 LBS | DIASTOLIC BLOOD PRESSURE: 81 MMHG | HEIGHT: 73 IN | BODY MASS INDEX: 30.88 KG/M2 | HEART RATE: 58 BPM | SYSTOLIC BLOOD PRESSURE: 139 MMHG | OXYGEN SATURATION: 98 %

## 2023-08-01 DIAGNOSIS — Z95.5 STENTED CORONARY ARTERY: ICD-10-CM

## 2023-08-01 DIAGNOSIS — G47.33 OSA (OBSTRUCTIVE SLEEP APNEA): ICD-10-CM

## 2023-08-01 DIAGNOSIS — Z95.818 PRESENCE OF WATCHMAN LEFT ATRIAL APPENDAGE CLOSURE DEVICE: ICD-10-CM

## 2023-08-01 DIAGNOSIS — E78.2 MIXED HYPERLIPIDEMIA: ICD-10-CM

## 2023-08-01 DIAGNOSIS — I50.32 CHRONIC DIASTOLIC CONGESTIVE HEART FAILURE: ICD-10-CM

## 2023-08-01 DIAGNOSIS — E66.09 CLASS 1 OBESITY DUE TO EXCESS CALORIES WITH SERIOUS COMORBIDITY AND BODY MASS INDEX (BMI) OF 30.0 TO 30.9 IN ADULT: ICD-10-CM

## 2023-08-01 DIAGNOSIS — Z98.890 S/P ABLATION OF ATRIAL FIBRILLATION: ICD-10-CM

## 2023-08-01 DIAGNOSIS — I45.10 RBBB: ICD-10-CM

## 2023-08-01 DIAGNOSIS — I25.10 CORONARY ARTERY DISEASE INVOLVING NATIVE CORONARY ARTERY OF NATIVE HEART WITHOUT ANGINA PECTORIS: Primary | ICD-10-CM

## 2023-08-01 DIAGNOSIS — I51.7 LVH (LEFT VENTRICULAR HYPERTROPHY): ICD-10-CM

## 2023-08-01 DIAGNOSIS — I27.20 PULMONARY HYPERTENSION: ICD-10-CM

## 2023-08-01 DIAGNOSIS — I10 PRIMARY HYPERTENSION: ICD-10-CM

## 2023-08-01 DIAGNOSIS — Z86.79 S/P ABLATION OF ATRIAL FIBRILLATION: ICD-10-CM

## 2023-08-01 DIAGNOSIS — I48.0 PAF (PAROXYSMAL ATRIAL FIBRILLATION): ICD-10-CM

## 2023-09-20 ENCOUNTER — TELEPHONE (OUTPATIENT)
Dept: CARDIOLOGY | Facility: CLINIC | Age: 83
End: 2023-09-20
Payer: MEDICARE

## 2023-09-20 NOTE — TELEPHONE ENCOUNTER
Called patient after reviewing his remote transmission from his loop recorder, revealing fast HR vs noise/ CHELE.  Patient was having an MRI at the time. CHELE confirmed.

## 2023-09-28 RX ORDER — SACUBITRIL AND VALSARTAN 97; 103 MG/1; MG/1
1 TABLET, FILM COATED ORAL 2 TIMES DAILY
Qty: 60 TABLET | Refills: 11 | Status: SHIPPED | OUTPATIENT
Start: 2023-09-28 | End: 2023-09-28 | Stop reason: SDUPTHER

## 2023-09-28 RX ORDER — SACUBITRIL AND VALSARTAN 97; 103 MG/1; MG/1
1 TABLET, FILM COATED ORAL 2 TIMES DAILY
Qty: 180 TABLET | Refills: 3 | Status: SHIPPED | OUTPATIENT
Start: 2023-09-28

## 2023-10-31 NOTE — PROGRESS NOTES
Chief Complaint  Coronary Artery Disease (3mo F/U) and Congestive Heart Failure    Subjective          Fred Johnson presents to [facility] CARDIOLOG[facility] for routine follow-up. He has coronary artery disease status post stents x5 with most recent being in 2019, paroxysmal atrial fibrillation status post ablation and watchman left atrial appendage closure device in 2019, left ventricular hypertrophy, chronic diastolic congestive heart failure, pulmonary hypertension, hypertension, hyperlipidemia, right bundle branch block, obstructive sleep apnea and obesity. He reports improvement in stamina since decreasing metoprolol tartrate to 12.5 mg twice daily.  He complains of chronic dyspnea with mild exertion, intermittent bilateral lower extremity edema and abdominal bloating that have improved since starting Entresto. Patient denies chest pain, palpitations, dizziness, syncope, orthopnea, or PND.  Patient denies any signs of bleeding.    Coronary Artery Disease  Presents for follow-up visit. Symptoms include shortness of breath. Pertinent negatives include no chest pain, chest pressure, chest tightness, dizziness, leg swelling, muscle weakness, palpitations or weight gain. Risk factors include hyperlipidemia and obesity. Risk factors do not include hypertension. His past medical history is significant for CHF. The symptoms have been stable. Compliance with diet is variable. Compliance with exercise is variable. Compliance with medications is good.   Atrial Fibrillation  Presents for follow-up visit. Symptoms include shortness of breath. Symptoms are negative for an AICD problem, bradycardia, chest pain, dizziness, hemodynamic instability, hypertension, hypotension, pacemaker problem, palpitations, syncope, tachycardia and weakness. The symptoms have been stable. Past medical history includes atrial fibrillation, CAD, CHF and hyperlipidemia.   Hypertension  This is a chronic problem. The current episode  started more than 1 year ago. The problem is uncontrolled. Associated symptoms include malaise/fatigue and shortness of breath. Pertinent negatives include no anxiety, blurred vision, chest pain, headaches, neck pain, orthopnea, palpitations, peripheral edema, PND or sweats. Risk factors for coronary artery disease include male gender, obesity and dyslipidemia. Current antihypertension treatment includes calcium channel blockers, diuretics and beta blockers. The current treatment provides significant improvement. Hypertensive end-organ damage includes CAD/MI, heart failure and left ventricular hypertrophy.   Hyperlipidemia  This is a chronic problem. The current episode started more than 1 year ago. Exacerbating diseases include obesity. Associated symptoms include shortness of breath. Pertinent negatives include no chest pain. Current antihyperlipidemic treatment includes statins. Risk factors for coronary artery disease include hypertension, male sex, obesity and dyslipidemia.   Congestive Heart Failure  Presents for follow-up visit. Associated symptoms include edema and shortness of breath. Pertinent negatives include no abdominal pain, chest pain, chest pressure, claudication, fatigue, muscle weakness, near-syncope, nocturia, orthopnea, palpitations, paroxysmal nocturnal dyspnea or unexpected weight change. The symptoms have been stable. His past medical history is significant for CAD. Compliance with total regimen is 51-75%. Compliance with diet is 51-75%. Compliance with exercise is 51-75%. Compliance with medications is %.       I have reviewed and confirmed the accuracy of the DANII George      Objective     Current Outpatient Medications:     aspirin 81 MG EC tablet, Take 1 tablet by mouth Daily., Disp: , Rfl:     atorvastatin (LIPITOR) 40 MG tablet, Take 1 tablet by mouth Daily., Disp: , Rfl:     cholecalciferol (VITAMIN D3) 25 MCG (1000 UT) tablet, Take 1 tablet by mouth Daily., Disp:  ", Rfl:     empagliflozin (Jardiance) 10 MG tablet tablet, Take 1 tablet by mouth Daily., Disp: 90 tablet, Rfl: 3    metoprolol succinate XL (TOPROL-XL) 25 MG 24 hr tablet, Take 0.5 tablets by mouth Daily., Disp: 45 tablet, Rfl: 3    nitroglycerin (NITROSTAT) 0.4 MG SL tablet, 1 under the tongue as needed for angina, may repeat q5mins for up three doses, Disp: 25 tablet, Rfl: 11    sacubitril-valsartan (Entresto)  MG tablet, Take 1 tablet by mouth 2 (Two) Times a Day., Disp: 180 tablet, Rfl: 3    spironolactone (ALDACTONE) 50 MG tablet, Take 1 tablet by mouth Daily., Disp: 90 tablet, Rfl: 3    Tildrakizumab-asmn (IIUMYA) 100 MG/ML injection, Inject 1 mL under the skin into the appropriate area as directed Every 3 (Three) Months., Disp: , Rfl:   Vital Signs:   /78   Pulse 65   Ht 185.4 cm (73\")   Wt 108 kg (238 lb)   SpO2 99%   BMI 31.40 kg/m²     Vitals and nursing note reviewed.   Constitutional:       General: Awake.      Appearance: Normal and healthy appearance. Well-developed and not in distress. Obese.   Eyes:      General: Lids are normal.      Conjunctiva/sclera: Conjunctivae normal.      Pupils: Pupils are equal, round, and reactive to light.   HENT:      Head: Normocephalic and atraumatic.      Nose: Nose normal.   Neck:      Vascular: No JVR. JVD normal.   Pulmonary:      Effort: Pulmonary effort is normal.      Breath sounds: Normal breath sounds. No decreased breath sounds. No wheezing. No rhonchi. No rales.   Chest:      Chest wall: Not tender to palpatation.   Cardiovascular:      PMI at left midclavicular line. Normal rate. Irregularly irregular rhythm. Normal S1. Normal S2.       Murmurs: There is no murmur.      No gallop.  No click. No rub.   Pulses:     Intact distal pulses.   Edema:     Peripheral edema absent.   Abdominal:      General: Bowel sounds are normal.      Palpations: Abdomen is soft.      Tenderness: There is no abdominal tenderness.   Musculoskeletal: Normal range of " motion.         General: No tenderness.      Cervical back: Normal range of motion. Skin:     General: Skin is warm and dry.   Neurological:      General: No focal deficit present.      Mental Status: Alert, oriented to person, place, and time and oriented to person, place and time.   Psychiatric:         Attention and Perception: Attention and perception normal.         Mood and Affect: Mood and affect normal.         Speech: Speech normal.         Behavior: Behavior normal. Behavior is cooperative.         Thought Content: Thought content normal.         Cognition and Memory: Cognition and memory normal.         Judgment: Judgment normal.        Result Review :   The following data was reviewed by: DANII Tsang on 11/01/2023      Data reviewed: Cardiology studies 2d echo 2/16/22           Assessment and Plan    Diagnoses and all orders for this visit:    1. Coronary artery disease involving native coronary artery of native heart without angina pectoris (Primary)-no clinical signs of ischemia.  Stable.    2. Stented coronary artery-x5 with the most recent being in 2019.  Patient continues on aspirin.  Denies bleeding.    3. PAF (paroxysmal atrial fibrillation) (HCC)- status post ablation.  2% burden on Linq interrogation 7/21/22 and 7/29/22. In sinus rhythm on EKG today. Rate controlled. Stable.  Continue metoprolol tartrate.    4. S/P ablation of atrial fibrillation- 2019 in [place].     5. Presence of Watchman left atrial appendage closure device-2019.    6. LVH (left ventricular hypertrophy)-mild concentric hypertrophy on 2D echo 2/16/2022.  Stable.    7. Chronic diastolic congestive heart failure (HCC)-grade 2 left ventricular diastolic dysfunction on 2D echo 2/16/2022. NYHA class III.  Stage C. Reviewed signs and symptoms of CHF and what to report with the patient. Patient instructed to restrict sodium and weigh daily. Report weight gain of greater than 2 lbs overnight or 5 lbs in 1 week. Pt  verbalized understanding of instructions and plan of care.  Continue Entresto, Jardiance and spironolactone. Unable to increase betablocker dose due to bradycardia.     8. Pulmonary hypertension (HCC)-mild on 2D echo 2/16/2022.  Likely group 2 related to left sided heart disease. Stable.    9. Primary hypertension-blood pressure is elevated in office today. Pt has not been monitoring routinely at home. Continue Entresto and metoprolol succinate.  Monitor and record daily blood pressure. Report readings consistently higher than 130/90 or consistently lower than 100/60.     10. Mixed hyperlipidemia-management per PCP.  Continue atorvastatin.    11. RBBB-chronic.  Stable.    12. Obstructive sleep apnea- pt is non-compliant with CPAP. He has been advised of potential health risks of untreated sleep apnea, including hypertension, atrial fibrillation, CHF and increased risk of stroke. He verbalized understanding.     13. Class 1 obesity due to excess calories with serious comorbidity and body mass index (BMI) of 31.0 to 31.9 in adult- BMI is >= 30 and <35. (Class 1 Obesity). The following options were offered after discussion;: weight loss educational material (shared in after visit summary).       Follow Up   Return in about 3 months (around 2/1/2024) for Next scheduled follow up.  Patient was given instructions and counseling regarding his condition or for health maintenance advice. Please see specific information pulled into the AVS if appropriate.

## 2023-11-01 ENCOUNTER — OFFICE VISIT (OUTPATIENT)
Dept: CARDIOLOGY | Facility: CLINIC | Age: 83
End: 2023-11-01
Payer: MEDICARE

## 2023-11-01 VITALS
WEIGHT: 238 LBS | BODY MASS INDEX: 31.54 KG/M2 | HEART RATE: 65 BPM | DIASTOLIC BLOOD PRESSURE: 78 MMHG | OXYGEN SATURATION: 99 % | SYSTOLIC BLOOD PRESSURE: 132 MMHG | HEIGHT: 73 IN

## 2023-11-01 DIAGNOSIS — I48.0 PAF (PAROXYSMAL ATRIAL FIBRILLATION): ICD-10-CM

## 2023-11-01 DIAGNOSIS — E66.09 CLASS 1 OBESITY DUE TO EXCESS CALORIES WITH SERIOUS COMORBIDITY AND BODY MASS INDEX (BMI) OF 31.0 TO 31.9 IN ADULT: ICD-10-CM

## 2023-11-01 DIAGNOSIS — I25.10 CORONARY ARTERY DISEASE INVOLVING NATIVE CORONARY ARTERY OF NATIVE HEART WITHOUT ANGINA PECTORIS: Primary | ICD-10-CM

## 2023-11-01 DIAGNOSIS — E78.2 MIXED HYPERLIPIDEMIA: ICD-10-CM

## 2023-11-01 DIAGNOSIS — Z86.79 S/P ABLATION OF ATRIAL FIBRILLATION: ICD-10-CM

## 2023-11-01 DIAGNOSIS — I27.20 PULMONARY HYPERTENSION: ICD-10-CM

## 2023-11-01 DIAGNOSIS — I10 PRIMARY HYPERTENSION: ICD-10-CM

## 2023-11-01 DIAGNOSIS — Z95.818 PRESENCE OF WATCHMAN LEFT ATRIAL APPENDAGE CLOSURE DEVICE: ICD-10-CM

## 2023-11-01 DIAGNOSIS — Z95.5 STENTED CORONARY ARTERY: ICD-10-CM

## 2023-11-01 DIAGNOSIS — I51.7 LVH (LEFT VENTRICULAR HYPERTROPHY): ICD-10-CM

## 2023-11-01 DIAGNOSIS — G47.33 OSA (OBSTRUCTIVE SLEEP APNEA): ICD-10-CM

## 2023-11-01 DIAGNOSIS — I50.32 CHRONIC DIASTOLIC CONGESTIVE HEART FAILURE: ICD-10-CM

## 2023-11-01 DIAGNOSIS — Z98.890 S/P ABLATION OF ATRIAL FIBRILLATION: ICD-10-CM

## 2023-11-01 DIAGNOSIS — I45.10 RBBB: ICD-10-CM

## 2023-11-01 RX ORDER — SPIRONOLACTONE 50 MG/1
50 TABLET, FILM COATED ORAL DAILY
Qty: 90 TABLET | Refills: 3 | Status: SHIPPED | OUTPATIENT
Start: 2023-11-01

## 2023-11-01 RX ORDER — METOPROLOL SUCCINATE 25 MG/1
12.5 TABLET, EXTENDED RELEASE ORAL DAILY
Qty: 45 TABLET | Refills: 3 | Status: SHIPPED | OUTPATIENT
Start: 2023-11-01 | End: 2023-11-01 | Stop reason: SDUPTHER

## 2023-11-01 RX ORDER — SACUBITRIL AND VALSARTAN 97; 103 MG/1; MG/1
1 TABLET, FILM COATED ORAL 2 TIMES DAILY
Qty: 180 TABLET | Refills: 3 | Status: SHIPPED | OUTPATIENT
Start: 2023-11-01

## 2023-11-01 RX ORDER — SPIRONOLACTONE 50 MG/1
50 TABLET, FILM COATED ORAL DAILY
Qty: 90 TABLET | Refills: 3 | Status: SHIPPED | OUTPATIENT
Start: 2023-11-01 | End: 2023-11-01 | Stop reason: SDUPTHER

## 2023-11-01 RX ORDER — SACUBITRIL AND VALSARTAN 97; 103 MG/1; MG/1
1 TABLET, FILM COATED ORAL 2 TIMES DAILY
Qty: 180 TABLET | Refills: 3 | Status: SHIPPED | OUTPATIENT
Start: 2023-11-01 | End: 2023-11-01 | Stop reason: SDUPTHER

## 2023-11-01 RX ORDER — METOPROLOL SUCCINATE 25 MG/1
12.5 TABLET, EXTENDED RELEASE ORAL DAILY
Qty: 45 TABLET | Refills: 3 | Status: SHIPPED | OUTPATIENT
Start: 2023-11-01

## 2024-01-30 ENCOUNTER — TELEPHONE (OUTPATIENT)
Dept: CARDIOLOGY | Facility: CLINIC | Age: 84
End: 2024-01-30
Payer: MEDICARE

## 2024-01-30 NOTE — TELEPHONE ENCOUNTER
Called patient and spoke with wife to inform them at Mr Johnson' loop recorder has reached JOHANNA.  We will continue to monitor transmissions as long as they continue to transmit.  He has an upcoming appt. with dr martinez 2/2/24 and can discuss need for replacement or removal if indicated.

## 2024-02-02 ENCOUNTER — OFFICE VISIT (OUTPATIENT)
Dept: CARDIOLOGY | Facility: CLINIC | Age: 84
End: 2024-02-02
Payer: MEDICARE

## 2024-02-02 VITALS
HEART RATE: 61 BPM | DIASTOLIC BLOOD PRESSURE: 61 MMHG | SYSTOLIC BLOOD PRESSURE: 103 MMHG | WEIGHT: 242 LBS | HEIGHT: 73 IN | OXYGEN SATURATION: 97 % | BODY MASS INDEX: 32.07 KG/M2

## 2024-02-02 DIAGNOSIS — Z86.79 S/P ABLATION OF ATRIAL FIBRILLATION: Primary | ICD-10-CM

## 2024-02-02 DIAGNOSIS — Z98.890 HISTORY OF LOOP RECORDER: ICD-10-CM

## 2024-02-02 DIAGNOSIS — I27.20 PULMONARY HYPERTENSION: ICD-10-CM

## 2024-02-02 DIAGNOSIS — Z95.818 PRESENCE OF WATCHMAN LEFT ATRIAL APPENDAGE CLOSURE DEVICE: ICD-10-CM

## 2024-02-02 DIAGNOSIS — Z98.890 S/P ABLATION OF ATRIAL FIBRILLATION: Primary | ICD-10-CM

## 2024-02-02 DIAGNOSIS — I10 PRIMARY HYPERTENSION: ICD-10-CM

## 2024-02-02 DIAGNOSIS — R94.31 ABNORMAL ECG: ICD-10-CM

## 2024-02-02 DIAGNOSIS — I51.7 LVH (LEFT VENTRICULAR HYPERTROPHY): ICD-10-CM

## 2024-02-02 DIAGNOSIS — I48.0 PAF (PAROXYSMAL ATRIAL FIBRILLATION): ICD-10-CM

## 2024-02-02 DIAGNOSIS — R06.09 DOE (DYSPNEA ON EXERTION): ICD-10-CM

## 2024-02-02 DIAGNOSIS — Z95.5 STENTED CORONARY ARTERY: ICD-10-CM

## 2024-02-02 DIAGNOSIS — E78.2 MIXED HYPERLIPIDEMIA: ICD-10-CM

## 2024-02-02 DIAGNOSIS — I25.10 CORONARY ARTERY DISEASE INVOLVING NATIVE CORONARY ARTERY OF NATIVE HEART WITHOUT ANGINA PECTORIS: ICD-10-CM

## 2024-02-02 DIAGNOSIS — I50.32 CHRONIC DIASTOLIC CONGESTIVE HEART FAILURE: ICD-10-CM

## 2024-02-02 DIAGNOSIS — I73.00 RAYNAUD'S DISEASE WITHOUT GANGRENE: ICD-10-CM

## 2024-02-02 DIAGNOSIS — I45.10 RBBB: ICD-10-CM

## 2024-02-02 RX ORDER — ATORVASTATIN CALCIUM 40 MG/1
40 TABLET, FILM COATED ORAL DAILY
Qty: 90 TABLET | Refills: 3 | Status: SHIPPED | OUTPATIENT
Start: 2024-02-02

## 2024-02-02 NOTE — PROGRESS NOTES
Fred Johnson  [id number]  1940  83 y.o.  male    Referring Provider: Chico Perkins MD    Reason for  Visit:  Here for routine follow up     Subjective    Overall feeling well   No chest pain or shortness of breath     No palpitations  No significant pedal edema    Compliant with medications and dietary advice  Fair effort tolerance    No presyncope or syncope  Compliant with medications    Tolerating current medications well with no untoward side effects   Compliant with prescribed medication regimen. Tries to adhere to cardiac diet.      In cold weather hands get white and starts burning   At other times while in warm environment no issues  Has back pain that limits his activities some  Needs back surgery     History of present illness:  Fred Johnson is a 83 y.o. yo male with coronary artery disease stented coronary artery atrial fibrillation  s/p  radiofrequency ablation    who presents today for   Chief Complaint   Patient presents with    Coronary Artery Disease     3 MO FU    Congestive Heart Failure   .    History  Past Medical History:   Diagnosis Date    Coronary artery disease    ,   Past Surgical History:   Procedure Laterality Date    ACHILLES TENDON REPAIR      CARDIAC CATHETERIZATION      CATARACT EXTRACTION, BILATERAL      CLAVICLE SURGERY Right     CORONARY STENT PLACEMENT      REPLACEMENT TOTAL KNEE BILATERAL      ROTATOR CUFF REPAIR      WRIST ARTHROPLASTY Right    ,   History reviewed. No pertinent family history.,   Social History     Tobacco Use    Smoking status: Never     Passive exposure: Never    Smokeless tobacco: Never   Vaping Use    Vaping Use: Never used   Substance Use Topics    Alcohol use: Yes     Comment: occ    Drug use: Never   ,     Medications  Current Outpatient Medications   Medication Sig Dispense Refill    aspirin 81 MG EC tablet Take 1 tablet by mouth Daily.      cholecalciferol (VITAMIN D3) 25 MCG (1000 UT) tablet Take 1 tablet by mouth Daily.       "empagliflozin (Jardiance) 10 MG tablet tablet Take 1 tablet by mouth Daily. 90 tablet 3    metoprolol succinate XL (TOPROL-XL) 25 MG 24 hr tablet Take 0.5 tablets by mouth Daily. 45 tablet 3    nitroglycerin (NITROSTAT) 0.4 MG SL tablet 1 under the tongue as needed for angina, may repeat q5mins for up three doses 25 tablet 11    sacubitril-valsartan (Entresto)  MG tablet Take 1 tablet by mouth 2 (Two) Times a Day. 180 tablet 3    spironolactone (ALDACTONE) 50 MG tablet Take 1 tablet by mouth Daily. 90 tablet 3    Tildrakizumab-asmn (IIUMYA) 100 MG/ML injection Inject 1 mL under the skin into the appropriate area as directed Every 3 (Three) Months.      atorvastatin (LIPITOR) 40 MG tablet Take 1 tablet by mouth Daily. 90 tablet 3     No current facility-administered medications for this visit.       Allergies:  Percocet [oxycodone-acetaminophen]    Review of Systems  Review of Systems   Constitutional: Negative.   HENT: Negative.     Eyes: Negative.    Cardiovascular:  Negative for chest pain, claudication, cyanosis, dyspnea on exertion, irregular heartbeat, leg swelling, near-syncope, orthopnea, palpitations, paroxysmal nocturnal dyspnea and syncope.   Respiratory: Negative.     Endocrine: Negative.    Hematologic/Lymphatic: Negative.    Skin: Negative.    Gastrointestinal:  Negative for anorexia.   Genitourinary: Negative.    Neurological: Negative.    Psychiatric/Behavioral: Negative.         Objective     Physical Exam:  /61 (BP Location: Left arm, Patient Position: Sitting, Cuff Size: Adult)   Pulse 61   Ht 185.4 cm (72.99\")   Wt 110 kg (242 lb)   SpO2 97%   BMI 31.94 kg/m²     Physical Exam  Constitutional:       Appearance: He is well-developed.   HENT:      Head: Normocephalic.   Neck:      Vascular: Normal carotid pulses. No carotid bruit or JVD.      Trachea: No tracheal tenderness or tracheal deviation.   Cardiovascular:      Rate and Rhythm: Rhythm irregular.      Pulses: Normal pulses. "      Heart sounds: Murmur heard.      Systolic murmur is present with a grade of 2/6.   Pulmonary:      Effort: Pulmonary effort is normal.      Breath sounds: No stridor.   Abdominal:      General: There is no distension.      Palpations: Abdomen is soft.      Tenderness: There is no abdominal tenderness.   Musculoskeletal:      Cervical back: No edema.   Skin:     General: Skin is warm.   Neurological:      Mental Status: He is alert.      Cranial Nerves: No cranial nerve deficit.      Sensory: No sensory deficit.   Psychiatric:         Speech: Speech normal.         Behavior: Behavior normal.         Results Review:  Results for orders placed during the hospital encounter of 02/16/22    Adult Transthoracic Echo Complete w/ Color, Spectral and Contrast if necessary per protocol    Interpretation Summary  · Left ventricular wall thickness is consistent with mild concentric hypertrophy.  · Left ventricular ejection fraction appears to be 61 - 65%. Left ventricular systolic function is normal.  · Abnormal global longitudinal LV strain (GLS) = -12.0%.  · Left ventricular diastolic function is consistent with (grade II w/high LAP) pseudonormalization.  · Left atrial volume is mildly increased.  · Mild pulmonary hypertension is present.        ____________________________________________________________________________________________________________________________________________  Health maintenance and recommendations    Low salt/ HTN/ Heart healthy carbohydrate restricted cardiac diet   The patient is advised to reduce or avoid caffeine or other cardiac stimulants.   Minimize or avoid  NSAID-type medications      Monitor for any signs of bleeding including red or dark stools. Fall precautions.   Advised staying uptodate with immunizations per established standard guidelines.    Offered to give patient  a copy of my notes     Questions were encouraged, asked and answered to the patient's  understanding and  satisfaction. Questions if any regarding current medications and side effects, need for refills and importance of compliance to medications stressed.    Reviewed available prior notes, consults, prior visits, laboratory findings, radiology and cardiology relevant reports. Updated chart as applicable. I have reviewed the patient's medical history in detail and updated the computerized patient record as relevant.      Updated patient regarding any new or relevant abnormalities on review of records or any new findings on physical exam. Mentioned to patient about purpose of visit and desirable health short and long term goals and objectives.    Primary to monitor CBC CMP Lipid panel and TSH as applicable    ___________________________________________________________________________________________________________________________________________     ECG 12 Lead    Date/Time: 2/2/2024 10:06 AM  Performed by: Everton Wasserman MD    Authorized by: Everton Wasserman MD  Comparison: compared with previous ECG from 5/18/2023  Comparison to previous ECG: Ventricular rate changed from 50 to   59    beats per minute  Frequent atrial and ventricular ectopy      Rhythm: sinus rhythm  Ectopy: unifocal PVCs and atrial premature contractions    Clinical impression: abnormal EKG          Assessment & Plan   Diagnoses and all orders for this visit:    1. S/P ablation of atrial fibrillation (Primary)    2. MUIR (dyspnea on exertion)  -     ECG 12 Lead  -     Stress Test With Myocardial Perfusion (1 Day); Future  -     Adult Transthoracic Echo Complete w/ Color, Spectral and Contrast if necessary per protocol; Future    3. Stented coronary artery  -     Stress Test With Myocardial Perfusion (1 Day); Future    4. Pulmonary hypertension    5. RBBB    6. Primary hypertension    7. Presence of Watchman left atrial appendage closure device    8. PAF (paroxysmal atrial fibrillation)  -     Holter Monitor - 72 Hour Up To 15 Days; Future    9. Mixed  hyperlipidemia    10. LVH (left ventricular hypertrophy)    11. Coronary artery disease involving native coronary artery of native heart without angina pectoris    12. Chronic diastolic congestive heart failure    13. Abnormal ECG  -     Holter Monitor - 72 Hour Up To 15 Days; Future    14. Raynaud's disease without gangrene    15. History of loop recorder  -     Loop Recorder Explant - Treatment Room; Future          Plan      Patient expressed understanding  Encouraged and answered all questions   Discussed with the patient and all questioned fully answered. He will call me if any problems arise.   Discussed results of prior testing with patient : echo   as well electrocardiogram from today     Avoid cold environment     He want loop recorder removed with dead battery     Orders Placed This Encounter   Procedures    Stress Test With Myocardial Perfusion (1 Day)     Standing Status:   Future     Standing Expiration Date:   2/1/2025     Order Specific Question:   What stress agent will be used?     Answer:   Regadenoson (Lexiscan)     Order Specific Question:   Difficulty walking criteria?     Answer:   Musculoskeletal (hips, knees, feet, back, amputee)     Order Specific Question:   Reason for exam?     Answer:   Known Coronary Artery Disease     Order Specific Question:   Release to patient     Answer:   Routine Release [[id number]]    Holter Monitor - 72 Hour Up To 15 Days     Standing Status:   Future     Standing Expiration Date:   2/2/2025     Order Specific Question:   Reason for exam?     Answer:   Other     Order Specific Question:   Reason for exam?     Answer:   AFib     Order Specific Question:   Other reason?     Answer:   frequent PACs     Order Specific Question:   How many days is the patient to wear the monitor?     Answer:   10     Order Specific Question:   Release to patient     Answer:   Routine Release [[id number]]    Loop Recorder Explant - Treatment Room     Standing Status:   Future      Standing Expiration Date:   2/2/2025     Order Specific Question:   What type of sedation does the patient require?     Answer:   Other     Order Specific Question:   Other (comment)     Answer:   local     Order Specific Question:   Reason for Exam:     Answer:   depleted battery     Order Specific Question:   Release to patient     Answer:   Routine Release [[id number]]    ECG 12 Lead     This order was created via procedure documentation     Order Specific Question:   Release to patient     Answer:   Routine Release [[id number]]    Adult Transthoracic Echo Complete w/ Color, Spectral and Contrast if necessary per protocol     Standing Status:   Future     Standing Expiration Date:   2/2/2025     Scheduling Instructions:      Myocardial strain to be performed       Use newer echo machine     Order Specific Question:   Reason for exam?     Answer:   Arrhythmia     Order Specific Question:   Release to patient     Answer:   Routine Release [[id number]]              Return in about 4 weeks (around 3/1/2024).

## 2024-02-05 ENCOUNTER — HOSPITAL ENCOUNTER (OUTPATIENT)
Dept: CARDIOLOGY | Facility: HOSPITAL | Age: 84
Discharge: HOME | End: 2024-02-05
Payer: MEDICARE

## 2024-02-05 VITALS
BODY MASS INDEX: 30.88 KG/M2 | SYSTOLIC BLOOD PRESSURE: 127 MMHG | WEIGHT: 233 LBS | HEIGHT: 73 IN | HEART RATE: 55 BPM | DIASTOLIC BLOOD PRESSURE: 71 MMHG

## 2024-02-05 DIAGNOSIS — R06.09 DOE (DYSPNEA ON EXERTION): ICD-10-CM

## 2024-02-05 DIAGNOSIS — I48.0 PAF (PAROXYSMAL ATRIAL FIBRILLATION): ICD-10-CM

## 2024-02-05 DIAGNOSIS — Z95.5 STENTED CORONARY ARTERY: ICD-10-CM

## 2024-02-05 PROCEDURE — A9502 TC99M TETROFOSMIN: HCPCS | Performed by: INTERNAL MEDICINE

## 2024-02-05 PROCEDURE — 93356 MYOCRD STRAIN IMG SPCKL TRCK: CPT

## 2024-02-05 PROCEDURE — 25010000002 REGADENOSON 0.4 MG/5ML SOLUTION: Performed by: INTERNAL MEDICINE

## 2024-02-05 PROCEDURE — 93306 TTE W/DOPPLER COMPLETE: CPT

## 2024-02-05 PROCEDURE — 0 TECHNETIUM TETROFOSMIN KIT: Performed by: INTERNAL MEDICINE

## 2024-02-05 PROCEDURE — 93017 CV STRESS TEST TRACING ONLY: CPT

## 2024-02-05 PROCEDURE — 93018 CV STRESS TEST I&R ONLY: CPT | Performed by: INTERNAL MEDICINE

## 2024-02-05 PROCEDURE — 78452 HT MUSCLE IMAGE SPECT MULT: CPT | Performed by: INTERNAL MEDICINE

## 2024-02-05 PROCEDURE — 25510000001 PERFLUTREN 6.52 MG/ML SUSPENSION: Performed by: INTERNAL MEDICINE

## 2024-02-05 PROCEDURE — 78452 HT MUSCLE IMAGE SPECT MULT: CPT

## 2024-02-05 RX ORDER — REGADENOSON 0.08 MG/ML
0.4 INJECTION, SOLUTION INTRAVENOUS ONCE
Status: COMPLETED | OUTPATIENT
Start: 2024-02-05 | End: 2024-02-05

## 2024-02-05 RX ADMIN — REGADENOSON 0.4 MG: 0.08 INJECTION, SOLUTION INTRAVENOUS at 12:39

## 2024-02-05 RX ADMIN — TETROFOSMIN 1 DOSE: 1.38 INJECTION, POWDER, LYOPHILIZED, FOR SOLUTION INTRAVENOUS at 11:17

## 2024-02-05 RX ADMIN — TETROFOSMIN 1 DOSE: 1.38 INJECTION, POWDER, LYOPHILIZED, FOR SOLUTION INTRAVENOUS at 13:36

## 2024-02-05 RX ADMIN — PERFLUTREN 13.04 MG: 6.52 INJECTION, SUSPENSION INTRAVENOUS at 11:55

## 2024-02-06 LAB
BH CV STRESS BP STAGE 1: NORMAL
BH CV STRESS COMMENTS STAGE 1: NORMAL
BH CV STRESS DOSE REGADENOSON STAGE 1: 0.4
BH CV STRESS DURATION MIN STAGE 1: 0
BH CV STRESS DURATION SEC STAGE 1: 10
BH CV STRESS HR STAGE 1: 55
BH CV STRESS PROTOCOL 1: NORMAL
BH CV STRESS RECOVERY BP: NORMAL MMHG
BH CV STRESS RECOVERY HR: 58 BPM
BH CV STRESS STAGE 1: 1
LV EF NUC BP: 58 %
MAXIMAL PREDICTED HEART RATE: 137 BPM
PERCENT MAX PREDICTED HR: 40.15 %
STRESS BASELINE BP: NORMAL MMHG
STRESS BASELINE HR: 55 BPM
STRESS PERCENT HR: 47 %
STRESS POST EXERCISE DUR SEC: 10 SEC
STRESS POST PEAK BP: NORMAL MMHG
STRESS POST PEAK HR: 55 BPM
STRESS TARGET HR: 116 BPM

## 2024-02-10 LAB
BH CV ECHO LEFT VENTRICLE GLOBAL LONGITUDINAL STRAIN: -11.6 %
BH CV ECHO MEAS - AO MAX PG: 9.1 MMHG
BH CV ECHO MEAS - AO MEAN PG: 5 MMHG
BH CV ECHO MEAS - AO V2 MAX: 151 CM/SEC
BH CV ECHO MEAS - AO V2 VTI: 32 CM
BH CV ECHO MEAS - AVA(I,D): 3 CM2
BH CV ECHO MEAS - EDV(CUBED): 103.8 ML
BH CV ECHO MEAS - EDV(MOD-SP2): 54.6 ML
BH CV ECHO MEAS - EDV(MOD-SP4): 55.4 ML
BH CV ECHO MEAS - EF(MOD-SP4): 58.1 %
BH CV ECHO MEAS - ESV(CUBED): 35.9 ML
BH CV ECHO MEAS - ESV(MOD-SP4): 23.2 ML
BH CV ECHO MEAS - FS: 29.8 %
BH CV ECHO MEAS - IVS/LVPW: 1.45 CM
BH CV ECHO MEAS - IVSD: 1.2 CM
BH CV ECHO MEAS - LA DIMENSION: 4 CM
BH CV ECHO MEAS - LAT PEAK E' VEL: 8.7 CM/SEC
BH CV ECHO MEAS - LV DIASTOLIC VOL/BSA (35-75): 24 CM2
BH CV ECHO MEAS - LV MASS(C)D: 251.4 GRAMS
BH CV ECHO MEAS - LV MAX PG: 3.3 MMHG
BH CV ECHO MEAS - LV MEAN PG: 2 MMHG
BH CV ECHO MEAS - LV SYSTOLIC VOL/BSA (12-30): 10 CM2
BH CV ECHO MEAS - LV V1 MAX: 90.7 CM/SEC
BH CV ECHO MEAS - LV V1 VTI: 21 CM
BH CV ECHO MEAS - LVIDD: 4.7 CM
BH CV ECHO MEAS - LVIDS: 3.3 CM
BH CV ECHO MEAS - LVOT AREA: 4.5 CM2
BH CV ECHO MEAS - LVOT DIAM: 2.4 CM
BH CV ECHO MEAS - LVPWD: 1.2 CM
BH CV ECHO MEAS - MED PEAK E' VEL: 5.8 CM/SEC
BH CV ECHO MEAS - MR MAX PG: 73.3 MMHG
BH CV ECHO MEAS - MR MAX VEL: 428 CM/SEC
BH CV ECHO MEAS - MV A MAX VEL: 66.8 CM/SEC
BH CV ECHO MEAS - MV DEC SLOPE: 180 CM/SEC2
BH CV ECHO MEAS - MV E MAX VEL: 55.2 CM/SEC
BH CV ECHO MEAS - MV E/A: 0.83
BH CV ECHO MEAS - MV P1/2T: 102.7 MSEC
BH CV ECHO MEAS - MVA(P1/2T): 2.14 CM2
BH CV ECHO MEAS - PA V2 MAX: 67.8 CM/SEC
BH CV ECHO MEAS - RAP SYSTOLE: 5 MMHG
BH CV ECHO MEAS - RV MAX PG: 1.88 MMHG
BH CV ECHO MEAS - RV V1 MAX: 68.6 CM/SEC
BH CV ECHO MEAS - RVDD: 3.8 CM
BH CV ECHO MEAS - RVSP: 26.3 MMHG
BH CV ECHO MEAS - SI(MOD-SP4): 13.9 ML/M2
BH CV ECHO MEAS - SV(LVOT): 95 ML
BH CV ECHO MEAS - SV(MOD-SP4): 32.2 ML
BH CV ECHO MEAS - TAPSE (>1.6): 1.75 CM
BH CV ECHO MEAS - TR MAX PG: 21.3 MMHG
BH CV ECHO MEAS - TR MAX VEL: 231 CM/SEC
BH CV ECHO MEASUREMENTS AVERAGE E/E' RATIO: 7.61
BH CV XLRA - RV BASE: 2.1 CM
LEFT ATRIUM VOLUME INDEX: 22.4 ML/M2
LEFT ATRIUM VOLUME: 51.8 ML

## 2024-02-26 ENCOUNTER — HOSPITAL ENCOUNTER (OUTPATIENT)
Dept: CARDIOLOGY | Facility: HOSPITAL | Age: 84
Setting detail: HOSPITAL OUTPATIENT SURGERY
Discharge: HOME | End: 2024-02-26
Admitting: INTERNAL MEDICINE
Payer: MEDICARE

## 2024-02-26 VITALS
BODY MASS INDEX: 31.01 KG/M2 | SYSTOLIC BLOOD PRESSURE: 111 MMHG | RESPIRATION RATE: 18 BRPM | TEMPERATURE: 97.3 F | OXYGEN SATURATION: 98 % | HEART RATE: 57 BPM | WEIGHT: 234 LBS | DIASTOLIC BLOOD PRESSURE: 64 MMHG | HEIGHT: 73 IN

## 2024-02-26 DIAGNOSIS — Z98.890 HISTORY OF LOOP RECORDER: ICD-10-CM

## 2024-02-26 PROCEDURE — 33286 RMVL SUBQ CAR RHYTHM MNTR: CPT

## 2024-02-26 PROCEDURE — 33286 RMVL SUBQ CAR RHYTHM MNTR: CPT | Performed by: INTERNAL MEDICINE

## 2024-02-26 RX ORDER — LIDOCAINE HYDROCHLORIDE 10 MG/ML
INJECTION, SOLUTION INFILTRATION; PERINEURAL
Status: DISPENSED
Start: 2024-02-26 | End: 2024-02-26

## 2024-02-26 NOTE — DISCHARGE INSTRUCTIONS
Remove tape and gauze in 48 hours.  May remove steri strips in one week or when they begin to curl up on the edges.  Tissue adhesive and sutures will dissolve on their own.  Keep incision dry for 10 days.

## 2024-03-18 NOTE — PROGRESS NOTES
Chief Complaint  Coronary Artery Disease (6wk F/U), Atrial Fibrillation, Results (Holter/Claire/Echo), and Surgical Clearance (Back ablation. Dr. [facility])    Subjective          Fred Johnson presents to [facility] for routine follow-up of outpatient testing and procedure.  2D echo on 2/5/2024 revealed normal left ventricular systolic function with ejection fraction 56 to 60%, mild concentric hypertrophy, normal left ventricular diastolic function and no significant valvular heart disease.  Claire scan on 2/5/2024 was low risk for ischemia.  11-day Holter monitor revealed predominantly sinus rhythm with frequent premature atrial contractions with a burden of 7.5%, occasional premature ventricular contractions with a burden of 5.3%, 432 runs of supraventricular tachycardia with longest duration of 52 seconds, no significant pauses and no correlated arrhythmia. He had his linq recorder removed recently.  He has coronary artery disease status post stents x5 with most recent being in 2019, paroxysmal atrial fibrillation status post ablation and watchman left atrial appendage closure device in 2019, supraventricular tachycardia, left ventricular hypertrophy, chronic diastolic congestive heart failure, pulmonary hypertension, hypertension, hyperlipidemia, right bundle branch block, obstructive sleep apnea and obesity.  He complains of chronic dyspnea with mild exertion, intermittent bilateral lower extremity edema and abdominal bloating that have improved since starting Entresto. Patient denies chest pain, palpitations, dizziness, syncope, orthopnea, or PND.  Patient denies any signs of bleeding.    Of note, patient has pending back surgery in the near future.    Coronary Artery Disease  Presents for follow-up visit. Symptoms include shortness of breath. Pertinent negatives include no chest pain, chest pressure, chest tightness, dizziness, leg swelling, muscle weakness, palpitations  or weight gain. Risk factors include hyperlipidemia and obesity. Risk factors do not include hypertension. His past medical history is significant for CHF. The symptoms have been stable. Compliance with diet is variable. Compliance with exercise is variable. Compliance with medications is good.   Atrial Fibrillation  Presents for follow-up visit. Symptoms include shortness of breath. Symptoms are negative for an AICD problem, bradycardia, chest pain, dizziness, hemodynamic instability, hypertension, hypotension, pacemaker problem, palpitations, syncope, tachycardia and weakness. The symptoms have been stable. Past medical history includes atrial fibrillation, CAD, CHF and hyperlipidemia.   Hypertension  This is a chronic problem. The current episode started more than 1 year ago. The problem is uncontrolled. Associated symptoms include malaise/fatigue and shortness of breath. Pertinent negatives include no anxiety, blurred vision, chest pain, headaches, neck pain, orthopnea, palpitations, peripheral edema, PND or sweats. Risk factors for coronary artery disease include male gender, obesity and dyslipidemia. Current antihypertension treatment includes calcium channel blockers, diuretics and beta blockers. The current treatment provides significant improvement. Hypertensive end-organ damage includes CAD/MI, heart failure and left ventricular hypertrophy.   Hyperlipidemia  This is a chronic problem. The current episode started more than 1 year ago. Exacerbating diseases include obesity. Associated symptoms include shortness of breath. Pertinent negatives include no chest pain. Current antihyperlipidemic treatment includes statins. Risk factors for coronary artery disease include hypertension, male sex, obesity and dyslipidemia.   Congestive Heart Failure  Presents for follow-up visit. Associated symptoms include edema and shortness of breath. Pertinent negatives include no abdominal pain, chest pain, chest pressure,  "claudication, fatigue, muscle weakness, near-syncope, nocturia, orthopnea, palpitations, paroxysmal nocturnal dyspnea or unexpected weight change. The symptoms have been stable. His past medical history is significant for CAD. Compliance with total regimen is 51-75%. Compliance with diet is 51-75%. Compliance with exercise is 51-75%. Compliance with medications is %.     I have reviewed and confirmed the accuracy of the ROS DANII Tsang      Objective     Current Outpatient Medications:     aspirin 81 MG EC tablet, Take 1 tablet by mouth Daily., Disp: , Rfl:     atorvastatin (LIPITOR) 40 MG tablet, Take 1 tablet by mouth Daily., Disp: 90 tablet, Rfl: 3    cholecalciferol (VITAMIN D3) 25 MCG (1000 UT) tablet, Take 1 tablet by mouth Daily., Disp: , Rfl:     empagliflozin (Jardiance) 10 MG tablet tablet, Take 1 tablet by mouth Daily., Disp: 90 tablet, Rfl: 3    metoprolol succinate XL (TOPROL-XL) 25 MG 24 hr tablet, Take 0.5 tablets by mouth Daily., Disp: 45 tablet, Rfl: 3    nitroglycerin (NITROSTAT) 0.4 MG SL tablet, 1 under the tongue as needed for angina, may repeat q5mins for up three doses, Disp: 25 tablet, Rfl: 11    sacubitril-valsartan (Entresto)  MG tablet, Take 1 tablet by mouth 2 (Two) Times a Day., Disp: 180 tablet, Rfl: 3    spironolactone (ALDACTONE) 50 MG tablet, Take 1 tablet by mouth Daily., Disp: 90 tablet, Rfl: 3    Tildrakizumab-asmn (IIUMYA) 100 MG/ML injection, Inject 1 mL under the skin into the appropriate area as directed Every 3 (Three) Months., Disp: , Rfl:   Vital Signs:   /73   Pulse 54   Ht 185.4 cm (73\")   Wt 109 kg (240 lb)   SpO2 99%   BMI 31.66 kg/m²     Vitals and nursing note reviewed.   Constitutional:       General: Awake.      Appearance: Normal and healthy appearance. Well-developed and not in distress. Obese.   Eyes:      General: Lids are normal.      Conjunctiva/sclera: Conjunctivae normal.      Pupils: Pupils are equal, round, and reactive " to light.   HENT:      Head: Normocephalic and atraumatic.      Nose: Nose normal.   Neck:      Vascular: No JVR. JVD normal.   Pulmonary:      Effort: Pulmonary effort is normal.      Breath sounds: Normal breath sounds. No decreased breath sounds. No wheezing. No rhonchi. No rales.   Chest:      Chest wall: Not tender to palpatation.   Cardiovascular:      PMI at left midclavicular line. Normal rate. Irregularly irregular rhythm. Normal S1. Normal S2.       Murmurs: There is no murmur.      No gallop.  No click. No rub.   Pulses:     Intact distal pulses.   Edema:     Peripheral edema absent.   Abdominal:      General: Bowel sounds are normal.      Palpations: Abdomen is soft.      Tenderness: There is no abdominal tenderness.   Musculoskeletal: Normal range of motion.         General: No tenderness.      Cervical back: Normal range of motion. Skin:     General: Skin is warm and dry.   Neurological:      General: No focal deficit present.      Mental Status: Alert, oriented to person, place, and time and oriented to person, place and time.   Psychiatric:         Attention and Perception: Attention and perception normal.         Mood and Affect: Mood and affect normal.         Speech: Speech normal.         Behavior: Behavior normal. Behavior is cooperative.         Thought Content: Thought content normal.         Cognition and Memory: Cognition and memory normal.         Judgment: Judgment normal.        Result Review :   The following data was reviewed by: DANII Tsang on 03/19/2024      Data reviewed: Cardiology studies 2d echo 2/16/22 and 2/5/24           Assessment and Plan    Diagnoses and all orders for this visit:    1. Coronary artery disease involving native coronary artery of native heart without angina pectoris (Primary)-no clinical signs of ischemia.  Stable.    2. Stented coronary artery-x5 with the most recent being in 2019.  Patient continues on aspirin.  Denies bleeding.    3. PAF  (paroxysmal atrial fibrillation) (HCC)- status post ablation.  9% burden on Linq interrogation 1/29/24. S/p device removal 2/26/24. In sinus rhythm on EKG today. Rate controlled. Stable.  Continue metoprolol tartrate.    4. S/P ablation of atrial fibrillation- 2019 in [place].     5. Presence of Watchman left atrial appendage closure device-2019.    6. LVH (left ventricular hypertrophy)-mild concentric hypertrophy on 2D echo 2/16/2022.  Stable.    7. Chronic diastolic congestive heart failure (HCC)-grade 2 left ventricular diastolic dysfunction on 2D echo 2/16/2022. Normal on most recent 2d echo. NYHA class III.  Stage C. Reviewed signs and symptoms of CHF and what to report with the patient. Patient instructed to restrict sodium and weigh daily. Report weight gain of greater than 2 lbs overnight or 5 lbs in 1 week. Pt verbalized understanding of instructions and plan of care.  Continue Entresto, Jardiance and spironolactone. Unable to increase betablocker dose due to bradycardia.     8. Pulmonary hypertension (HCC)-mild on 2D echo 2/16/2022.  Likely group 2 related to left sided heart disease. Stable.    9. Primary hypertension-blood pressure is elevated in office today, however pt states he has not taken his medications today. Continue Entresto and metoprolol succinate.  Monitor and record daily blood pressure. Report readings consistently higher than 130/80 or consistently lower than 100/60.     10. Mixed hyperlipidemia-management per PCP.  Continue atorvastatin.    11. RBBB-chronic.  Stable.    12. Obstructive sleep apnea- pt is non-compliant with CPAP. He has been advised of potential health risks of untreated sleep apnea, including hypertension, atrial fibrillation, CHF and increased risk of stroke. He verbalized understanding.     13. Class 1 obesity due to excess calories with serious comorbidity and body mass index (BMI) of 31.0 to 31.9 in adult- BMI is >= 30 and <35. (Class 1 Obesity). The following  "options were offered after discussion;: weight loss educational material (shared in after visit summary).     14. Paroxysmal supraventicular tachycardia- 432 runs with longest duration of 52 seconds on most recent holter monitor. Relatively asymptomatic. Unable to up titrate betablocker due to bradycardia. Consider EP referral, if symptoms worsen.     Risk assessment- from a revised cardiac risk index standpoint, patient is moderate risk for a major cardiac event with back surgery. This is primarily because he has a known history of coronary/ischemic heart disease and congestive heart failure, but no known history of cerebrovascular disease, insulin-dependent diabetes mellitus, or creatinine greater than 2. This correlates to a 6.6% estimated rate of myocardial infarction, pulmonary edema, ventricular fibrillation, cardiac arrest, or complete heart block. However, this is non-modifiable because he has no signs or symptoms of the following \"active cardiac conditions\"- acute coronary syndrome, acutely decompensated congestive heart failure, uncontrolled arrhythmias, or significant valvular disease. Therefore, recommend proceeding with the planned surgery without further delay.    Continue aspirin without interruption.     Follow Up   Return in about 3 months (around 6/19/2024) for Next scheduled follow up.  Patient was given instructions and counseling regarding his condition or for health maintenance advice. Please see specific information pulled into the AVS if appropriate.       "

## 2024-03-19 ENCOUNTER — OFFICE VISIT (OUTPATIENT)
Dept: CARDIOLOGY | Facility: CLINIC | Age: 84
End: 2024-03-19
Payer: MEDICARE

## 2024-03-19 VITALS
SYSTOLIC BLOOD PRESSURE: 142 MMHG | HEART RATE: 54 BPM | DIASTOLIC BLOOD PRESSURE: 73 MMHG | WEIGHT: 240 LBS | HEIGHT: 73 IN | OXYGEN SATURATION: 99 % | BODY MASS INDEX: 31.81 KG/M2

## 2024-03-19 DIAGNOSIS — I10 PRIMARY HYPERTENSION: ICD-10-CM

## 2024-03-19 DIAGNOSIS — Z95.818 PRESENCE OF WATCHMAN LEFT ATRIAL APPENDAGE CLOSURE DEVICE: ICD-10-CM

## 2024-03-19 DIAGNOSIS — Z98.890 S/P ABLATION OF ATRIAL FIBRILLATION: ICD-10-CM

## 2024-03-19 DIAGNOSIS — Z95.5 STENTED CORONARY ARTERY: ICD-10-CM

## 2024-03-19 DIAGNOSIS — E66.09 CLASS 1 OBESITY DUE TO EXCESS CALORIES WITH SERIOUS COMORBIDITY AND BODY MASS INDEX (BMI) OF 31.0 TO 31.9 IN ADULT: ICD-10-CM

## 2024-03-19 DIAGNOSIS — I47.10 PSVT (PAROXYSMAL SUPRAVENTRICULAR TACHYCARDIA): ICD-10-CM

## 2024-03-19 DIAGNOSIS — I50.32 CHRONIC DIASTOLIC CONGESTIVE HEART FAILURE: ICD-10-CM

## 2024-03-19 DIAGNOSIS — I45.10 RBBB: ICD-10-CM

## 2024-03-19 DIAGNOSIS — G47.33 OSA (OBSTRUCTIVE SLEEP APNEA): ICD-10-CM

## 2024-03-19 DIAGNOSIS — I27.20 PULMONARY HYPERTENSION: ICD-10-CM

## 2024-03-19 DIAGNOSIS — I51.7 LVH (LEFT VENTRICULAR HYPERTROPHY): ICD-10-CM

## 2024-03-19 DIAGNOSIS — I48.0 PAF (PAROXYSMAL ATRIAL FIBRILLATION): ICD-10-CM

## 2024-03-19 DIAGNOSIS — E78.2 MIXED HYPERLIPIDEMIA: ICD-10-CM

## 2024-03-19 DIAGNOSIS — I25.10 CORONARY ARTERY DISEASE INVOLVING NATIVE CORONARY ARTERY OF NATIVE HEART WITHOUT ANGINA PECTORIS: Primary | ICD-10-CM

## 2024-03-19 DIAGNOSIS — Z86.79 S/P ABLATION OF ATRIAL FIBRILLATION: ICD-10-CM

## 2024-04-26 ENCOUNTER — TELEPHONE (OUTPATIENT)
Dept: CARDIOLOGY | Facility: CLINIC | Age: 84
End: 2024-04-26
Payer: MEDICARE

## 2024-04-26 NOTE — TELEPHONE ENCOUNTER
CARDIAC RISK ASSESSMENT WAS IN THE OFFICE NOTE ON 03/19/2024 IT HAS BEEN FAXED TO [facility] @ [phone]    FAXED & SCANNED  ON 04/26/2024 @ 2:42

## 2024-04-30 ENCOUNTER — TELEPHONE (OUTPATIENT)
Dept: CARDIOLOGY | Facility: CLINIC | Age: 84
End: 2024-04-30
Payer: MEDICARE

## 2024-04-30 NOTE — TELEPHONE ENCOUNTER
Hub staff attempted to follow warm transfer process and was unsuccessful     Caller: ANISH    Relationship to patient: PHANI LAZARO SURGERY COORDINATOR  - CAN LEAVE MESSAGE IF NO ANSWER WITH DETAILS.     Best call back number: [phone]    Patient is needing: PATIENT IS NEEDING CARDIAC CLEARANCE FOR PROCEDURE. PATIENT WANTS TO STOP TAKING ASPRIN AND DO THE ORIGINAL SURGERY INSTEAD OF THE REPOSED SURGERY THAT WOULD OK TO KEEP TAKING THE ASPRIN.  PATIENT IS CURRENTLY NOT SCHEDULED FOR SURGERY UNTIL RISK FACTORS ARE GIVEN FROM THE OFFICE. PLEASE CALL ANISH BACK.

## 2024-06-19 NOTE — PROGRESS NOTES
Chief Complaint  Congestive Heart Failure (3mo F/U), Coronary Artery Disease, and Paroxysmal Afib    Subjective          Fred Johnson presents to [facility] for routine follow-up.  He has coronary artery disease status post stents x5 with most recent being in 2019, paroxysmal atrial fibrillation status post ablation and watchman left atrial appendage closure device in 2019, supraventricular tachycardia, left ventricular hypertrophy, chronic diastolic congestive heart failure, pulmonary hypertension, hypertension, hyperlipidemia, right bundle branch block, obstructive sleep apnea and obesity.  He complains of chronic dyspnea with mild exertion, intermittent bilateral lower extremity edema and abdominal bloating that have improved since starting Entresto. Patient denies chest pain, palpitations, dizziness, syncope, orthopnea, or PND.  Patient denies any signs of bleeding.    Coronary Artery Disease  Presents for follow-up visit. Symptoms include shortness of breath. Pertinent negatives include no chest pain, chest pressure, chest tightness, dizziness, leg swelling, muscle weakness, palpitations or weight gain. Risk factors include hyperlipidemia and obesity. Risk factors do not include hypertension. His past medical history is significant for CHF. The symptoms have been stable. Compliance with diet is variable. Compliance with exercise is variable. Compliance with medications is good.   Atrial Fibrillation  Presents for follow-up visit. Symptoms include shortness of breath. Symptoms are negative for an AICD problem, bradycardia, chest pain, dizziness, hemodynamic instability, hypertension, hypotension, pacemaker problem, palpitations, syncope, tachycardia and weakness. The symptoms have been stable. Past medical history includes atrial fibrillation, CAD, CHF and hyperlipidemia.   Hypertension  This is a chronic problem. The current episode started more than 1 year ago. The problem is  uncontrolled. Associated symptoms include malaise/fatigue and shortness of breath. Pertinent negatives include no anxiety, blurred vision, chest pain, headaches, neck pain, orthopnea, palpitations, peripheral edema, PND or sweats. Risk factors for coronary artery disease include male gender, obesity and dyslipidemia. Current antihypertension treatment includes calcium channel blockers, diuretics and beta blockers. The current treatment provides significant improvement. Hypertensive end-organ damage includes CAD/MI, heart failure and left ventricular hypertrophy.   Hyperlipidemia  This is a chronic problem. The current episode started more than 1 year ago. Exacerbating diseases include obesity. Associated symptoms include shortness of breath. Pertinent negatives include no chest pain. Current antihyperlipidemic treatment includes statins. Risk factors for coronary artery disease include hypertension, male sex, obesity and dyslipidemia.   Congestive Heart Failure  Presents for follow-up visit. Associated symptoms include edema and shortness of breath. Pertinent negatives include no abdominal pain, chest pain, chest pressure, claudication, fatigue, muscle weakness, near-syncope, nocturia, orthopnea, palpitations, paroxysmal nocturnal dyspnea or unexpected weight change. The symptoms have been stable. His past medical history is significant for CAD. Compliance with total regimen is 51-75%. Compliance with diet is 51-75%. Compliance with exercise is 51-75%. Compliance with medications is %.     I have reviewed and confirmed the accuracy of the ROS DANII Tsang        Objective     Current Outpatient Medications:     aspirin 81 MG EC tablet, Take 1 tablet by mouth Daily., Disp: , Rfl:     atorvastatin (LIPITOR) 40 MG tablet, Take 1 tablet by mouth Daily., Disp: 90 tablet, Rfl: 3    cholecalciferol (VITAMIN D3) 25 MCG (1000 UT) tablet, Take 1 tablet by mouth Daily., Disp: , Rfl:     empagliflozin  "(Jardiance) 10 MG tablet tablet, Take 1 tablet by mouth Daily., Disp: 90 tablet, Rfl: 3    metoprolol succinate XL (TOPROL-XL) 25 MG 24 hr tablet, Take 0.5 tablets by mouth Daily., Disp: 45 tablet, Rfl: 3    nitroglycerin (NITROSTAT) 0.4 MG SL tablet, 1 under the tongue as needed for angina, may repeat q5mins for up three doses, Disp: 25 tablet, Rfl: 11    sacubitril-valsartan (Entresto)  MG tablet, Take 1 tablet by mouth 2 (Two) Times a Day., Disp: 180 tablet, Rfl: 3    spironolactone (ALDACTONE) 50 MG tablet, Take 1 tablet by mouth Daily., Disp: 90 tablet, Rfl: 3    Tildrakizumab-asmn (IIUMYA) 100 MG/ML injection, Inject 1 mL under the skin into the appropriate area as directed Every 3 (Three) Months., Disp: , Rfl:   Vital Signs:   /66   Pulse 62   Ht 185.4 cm (73\")   Wt 105 kg (232 lb)   SpO2 98%   BMI 30.61 kg/m²     Vitals and nursing note reviewed.   Constitutional:       General: Awake.      Appearance: Normal and healthy appearance. Well-developed and not in distress. Obese.   Eyes:      General: Lids are normal.      Conjunctiva/sclera: Conjunctivae normal.      Pupils: Pupils are equal, round, and reactive to light.   HENT:      Head: Normocephalic and atraumatic.      Nose: Nose normal.   Neck:      Vascular: No JVR. JVD normal.   Pulmonary:      Effort: Pulmonary effort is normal.      Breath sounds: Normal breath sounds. No decreased breath sounds. No wheezing. No rhonchi. No rales.   Chest:      Chest wall: Not tender to palpatation.   Cardiovascular:      PMI at left midclavicular line. Normal rate. Regular rhythm. Normal S1. Normal S2.       Murmurs: There is no murmur.      No gallop.  No click. No rub.   Pulses:     Intact distal pulses.   Edema:     Peripheral edema absent.   Abdominal:      General: Bowel sounds are normal.      Palpations: Abdomen is soft.      Tenderness: There is no abdominal tenderness.   Musculoskeletal: Normal range of motion.         General: No " tenderness.      Cervical back: Normal range of motion. Skin:     General: Skin is warm and dry.   Neurological:      General: No focal deficit present.      Mental Status: Alert, oriented to person, place, and time and oriented to person, place and time.   Psychiatric:         Attention and Perception: Attention and perception normal.         Mood and Affect: Mood and affect normal.         Speech: Speech normal.         Behavior: Behavior normal. Behavior is cooperative.         Thought Content: Thought content normal.         Cognition and Memory: Cognition and memory normal.         Judgment: Judgment normal.        Result Review :   The following data was reviewed by: DANII Tsang on 6/20/2024    Data reviewed: Cardiology studies 2d echo 2/16/22 and 2/5/24           Assessment and Plan    Diagnoses and all orders for this visit:    1. Coronary artery disease involving native coronary artery of native heart without angina pectoris (Primary)-no clinical signs of ischemia.  Stable.    2. Stented coronary artery-x5 with the most recent being in 2019.  Patient continues on aspirin.  Denies bleeding.    3. PAF (paroxysmal atrial fibrillation) ([facility])- status post ablation.  No recurrence on most recent Holter monitor to 2/5/24. In sinus rhythm on EKG today. Rate controlled. Stable.  Continue metoprolol tartrate.    4. S/P ablation of atrial fibrillation- 2019 in [place].     5. Presence of Watchman left atrial appendage closure device-2019.    6. LVH (left ventricular hypertrophy)-mild concentric hypertrophy on 2D echo 2/16/2022.  Stable.    7. Chronic diastolic congestive heart failure (HCC)-grade 2 left ventricular diastolic dysfunction on 2D echo 2/16/2022. Normal on most recent 2d echo. NYHA class III.  Stage C. Reviewed signs and symptoms of CHF and what to report with the patient. Patient instructed to restrict sodium and weigh daily. Report weight gain of greater than 2 lbs overnight or 5 lbs in 1  week. Pt verbalized understanding of instructions and plan of care.  Continue Entresto, Jardiance and spironolactone. Unable to increase betablocker dose due to bradycardia.     8. Pulmonary hypertension (HCC)-mild on 2D echo 2/16/2022.  Likely group 2 related to left sided heart disease. Stable.    9. Primary hypertension-blood pressure is well-controlled. Continue Entresto and metoprolol succinate.  Monitor and record daily blood pressure. Report readings consistently higher than 130/80 or consistently lower than 100/60.     10. Mixed hyperlipidemia-management per PCP.  Continue atorvastatin.    11. RBBB-chronic.  Stable.    12. Obstructive sleep apnea- pt is non-compliant with CPAP. He has been advised of potential health risks of untreated sleep apnea, including hypertension, atrial fibrillation, CHF and increased risk of stroke. He verbalized understanding.     13. Class 1 obesity due to excess calories with serious comorbidity and body mass index (BMI) of 30.0 to 30.9 in adult- BMI is >= 30 and <35. (Class 1 Obesity). The following options were offered after discussion;: weight loss educational material (shared in after visit summary).     14. Paroxysmal supraventicular tachycardia- 432 runs with longest duration of 52 seconds on most recent holter monitor. Pt complains of increased fatigue and decreased stamina. Unable to up titrate betablocker due to bradycardia. Referral to electrophysiology.     Follow Up   Return in about 3 months (around 9/20/2024) for Next scheduled follow up.  Patient was given instructions and counseling regarding his condition or for health maintenance advice. Please see specific information pulled into the AVS if appropriate.

## 2024-06-20 ENCOUNTER — OFFICE VISIT (OUTPATIENT)
Dept: CARDIOLOGY | Facility: CLINIC | Age: 84
End: 2024-06-20
Payer: MEDICARE

## 2024-06-20 VITALS
BODY MASS INDEX: 30.75 KG/M2 | HEART RATE: 62 BPM | HEIGHT: 73 IN | WEIGHT: 232 LBS | SYSTOLIC BLOOD PRESSURE: 111 MMHG | DIASTOLIC BLOOD PRESSURE: 66 MMHG | OXYGEN SATURATION: 98 %

## 2024-06-20 DIAGNOSIS — E78.2 MIXED HYPERLIPIDEMIA: ICD-10-CM

## 2024-06-20 DIAGNOSIS — G47.33 OSA (OBSTRUCTIVE SLEEP APNEA): ICD-10-CM

## 2024-06-20 DIAGNOSIS — I45.10 RBBB: ICD-10-CM

## 2024-06-20 DIAGNOSIS — I10 PRIMARY HYPERTENSION: ICD-10-CM

## 2024-06-20 DIAGNOSIS — I51.7 LVH (LEFT VENTRICULAR HYPERTROPHY): ICD-10-CM

## 2024-06-20 DIAGNOSIS — Z95.5 STENTED CORONARY ARTERY: ICD-10-CM

## 2024-06-20 DIAGNOSIS — I50.32 CHRONIC DIASTOLIC CONGESTIVE HEART FAILURE: ICD-10-CM

## 2024-06-20 DIAGNOSIS — Z95.818 PRESENCE OF WATCHMAN LEFT ATRIAL APPENDAGE CLOSURE DEVICE: ICD-10-CM

## 2024-06-20 DIAGNOSIS — I27.20 PULMONARY HYPERTENSION: ICD-10-CM

## 2024-06-20 DIAGNOSIS — E66.09 CLASS 1 OBESITY DUE TO EXCESS CALORIES WITH SERIOUS COMORBIDITY AND BODY MASS INDEX (BMI) OF 30.0 TO 30.9 IN ADULT: ICD-10-CM

## 2024-06-20 DIAGNOSIS — I48.0 PAF (PAROXYSMAL ATRIAL FIBRILLATION): ICD-10-CM

## 2024-06-20 DIAGNOSIS — Z86.79 S/P ABLATION OF ATRIAL FIBRILLATION: ICD-10-CM

## 2024-06-20 DIAGNOSIS — Z98.890 S/P ABLATION OF ATRIAL FIBRILLATION: ICD-10-CM

## 2024-06-20 DIAGNOSIS — I25.10 CORONARY ARTERY DISEASE INVOLVING NATIVE CORONARY ARTERY OF NATIVE HEART WITHOUT ANGINA PECTORIS: Primary | ICD-10-CM

## 2024-06-20 DIAGNOSIS — I47.10 PSVT (PAROXYSMAL SUPRAVENTRICULAR TACHYCARDIA): ICD-10-CM

## 2024-06-20 RX ORDER — NITROGLYCERIN 0.4 MG/1
TABLET SUBLINGUAL
Qty: 25 TABLET | Refills: 11 | Status: SHIPPED | OUTPATIENT
Start: 2024-06-20 | End: 2024-06-20 | Stop reason: SDUPTHER

## 2024-06-20 RX ORDER — NITROGLYCERIN 0.4 MG/1
TABLET SUBLINGUAL
Qty: 25 TABLET | Refills: 11 | Status: SHIPPED | OUTPATIENT
Start: 2024-06-20

## 2024-06-20 RX ORDER — SPIRONOLACTONE 50 MG/1
50 TABLET, FILM COATED ORAL DAILY
Qty: 90 TABLET | Refills: 3 | Status: SHIPPED | OUTPATIENT
Start: 2024-06-20

## 2024-06-20 RX ORDER — SACUBITRIL AND VALSARTAN 97; 103 MG/1; MG/1
1 TABLET, FILM COATED ORAL 2 TIMES DAILY
Qty: 180 TABLET | Refills: 3 | Status: SHIPPED | OUTPATIENT
Start: 2024-06-20

## 2024-06-20 RX ORDER — ATORVASTATIN CALCIUM 40 MG/1
40 TABLET, FILM COATED ORAL DAILY
Qty: 90 TABLET | Refills: 3 | Status: SHIPPED | OUTPATIENT
Start: 2024-06-20

## 2024-06-20 RX ORDER — METOPROLOL SUCCINATE 25 MG/1
12.5 TABLET, EXTENDED RELEASE ORAL DAILY
Qty: 45 TABLET | Refills: 3 | Status: SHIPPED | OUTPATIENT
Start: 2024-06-20

## 2024-08-23 ENCOUNTER — TELEPHONE (OUTPATIENT)
Dept: CARDIOLOGY | Facility: CLINIC | Age: 84
End: 2024-08-23
Payer: MEDICARE

## 2024-08-23 NOTE — TELEPHONE ENCOUNTER
[facility] cardiac [facility] and anticoag recommendations for a Basivertebral nerve ablation to be done on 9/5.    Please advise

## 2024-09-17 ENCOUNTER — PREP FOR SURGERY (OUTPATIENT)
Dept: OTHER | Facility: HOSPITAL | Age: 84
End: 2024-09-17
Payer: MEDICARE

## 2024-09-17 ENCOUNTER — OFFICE VISIT (OUTPATIENT)
Dept: CARDIOLOGY | Facility: CLINIC | Age: 84
End: 2024-09-17
Payer: MEDICARE

## 2024-09-17 VITALS
HEART RATE: 119 BPM | WEIGHT: 223 LBS | DIASTOLIC BLOOD PRESSURE: 54 MMHG | SYSTOLIC BLOOD PRESSURE: 110 MMHG | HEIGHT: 73 IN | BODY MASS INDEX: 29.55 KG/M2 | OXYGEN SATURATION: 98 %

## 2024-09-17 DIAGNOSIS — I45.2 BIFASCICULAR BLOCK: ICD-10-CM

## 2024-09-17 DIAGNOSIS — I48.19 ATRIAL FIBRILLATION, PERSISTENT: Primary | ICD-10-CM

## 2024-09-17 DIAGNOSIS — I48.0 PAF (PAROXYSMAL ATRIAL FIBRILLATION): Primary | ICD-10-CM

## 2024-09-17 DIAGNOSIS — I48.4 ATYPICAL ATRIAL FLUTTER: ICD-10-CM

## 2024-09-17 RX ORDER — SODIUM CHLORIDE 9 MG/ML
40 INJECTION, SOLUTION INTRAVENOUS AS NEEDED
OUTPATIENT
Start: 2024-09-17

## 2024-09-17 RX ORDER — SODIUM CHLORIDE 0.9 % (FLUSH) 0.9 %
10 SYRINGE (ML) INJECTION EVERY 12 HOURS SCHEDULED
OUTPATIENT
Start: 2024-09-17

## 2024-09-17 RX ORDER — SODIUM CHLORIDE 0.9 % (FLUSH) 0.9 %
10 SYRINGE (ML) INJECTION AS NEEDED
OUTPATIENT
Start: 2024-09-17

## 2024-09-17 RX ORDER — AMIODARONE HYDROCHLORIDE 200 MG/1
TABLET ORAL
Qty: 90 TABLET | Refills: 0 | Status: SHIPPED | OUTPATIENT
Start: 2024-09-17 | End: 2024-12-26

## 2024-09-30 ENCOUNTER — HOSPITAL ENCOUNTER (OUTPATIENT)
Dept: CARDIOLOGY | Facility: HOSPITAL | Age: 84
Discharge: HOME | End: 2024-09-30
Admitting: STUDENT IN AN ORGANIZED HEALTH CARE EDUCATION/TRAINING PROGRAM
Payer: MEDICARE

## 2024-09-30 ENCOUNTER — APPOINTMENT (OUTPATIENT)
Dept: CARDIOLOGY | Facility: HOSPITAL | Age: 84
End: 2024-09-30
Payer: MEDICARE

## 2024-09-30 DIAGNOSIS — I48.19 ATRIAL FIBRILLATION, PERSISTENT: ICD-10-CM

## 2024-09-30 PROCEDURE — 93005 ELECTROCARDIOGRAM TRACING: CPT | Performed by: STUDENT IN AN ORGANIZED HEALTH CARE EDUCATION/TRAINING PROGRAM

## 2024-09-30 RX ORDER — AMIODARONE HYDROCHLORIDE 200 MG/1
200 TABLET ORAL DAILY
Start: 2024-09-30 | End: 2025-09-25

## 2024-09-30 NOTE — PROGRESS NOTES
Presented for ADEBAYO and CV today.  Back in sinus rhythm on amiodarone.  Feeling well.  Plan for outpatient ablation as scheduled.

## 2024-09-30 NOTE — NURSING NOTE
12 lead EKG performed patient in sinus rhythm. Dr Cash at bedside and spoke with patient. Procedure cancelled for today. Patient instructed to stop his metoprolol. Patient is scheduled for future ablation

## 2024-10-02 NOTE — PROGRESS NOTES
Reason For Visit:  Coronary Artery Disease     Subjective        Fred Johnson is a 84 y.o. male with the below pertinent PMH who presents for follow-up of CAD and A-fib.    Patient was last seen by general cardiology 6/20/2024.  At that time he is doing reasonly well.  He had been referred to electrophysiology for consideration of advanced procedure given his episodes of PSVT which he was symptomatic to.  He established with electrophysiology in September and there is concern that he was in atypical atrial flutter.  He is scheduled for ADEBAYO cardioversion and started on amiodarone.  Patient presented for ADEBAYO cardioversion was noted to be in normal sinus rhythm, therefore he was maintained on amiodarone and he will be scheduled for an ablation in the near future.    From a cardiac perspective he is doing well at this time.  He is having no anginal symptoms.  He tells me that he will be undergoing ablation in the near future.  He does tell me over the last week or so that has been having more blood pressure issues.  His blood pressure this morning at home was in the 170s.  He said this has been a problem since he stopped the Jardiance and the metoprolol.      ROS: Pertinent findings as noted above    Pertinent PMH  -Coronary artery disease status post PCI x 5 with most recent in 2019  - Paroxysmal atrial fibrillation status post ablation in 2019  - Left atrial appendage closure with Watchman device in 2019  - SVT  - LVH  - Chronic diastolic congestive heart failure  - Pulmonary hypertension  - Hypertension  - Hyperlipidemia  - Obstructive sleep apnea  - Obesity      Pertinent past medical, surgical, family, and social history were reviewed.      Current Outpatient Medications:     amiodarone (PACERONE) 200 MG tablet, Take 1 tablet by mouth Daily for 360 days., Disp: , Rfl:     aspirin 81 MG EC tablet, Take 1 tablet by mouth Daily., Disp: , Rfl:     atorvastatin (LIPITOR) 40 MG tablet, Take 1 tablet by mouth Daily.,  "Disp: 90 tablet, Rfl: 3    cholecalciferol (VITAMIN D3) 25 MCG (1000 UT) tablet, Take 1 tablet by mouth Daily., Disp: , Rfl:     Loratadine (CLARITIN PO), Take  by mouth., Disp: , Rfl:     nitroglycerin (NITROSTAT) 0.4 MG SL tablet, 1 under the tongue as needed for angina, may repeat q5mins for up three doses, Disp: 25 tablet, Rfl: 11    sacubitril-valsartan (Entresto)  MG tablet, Take 1 tablet by mouth 2 (Two) Times a Day., Disp: 180 tablet, Rfl: 3    spironolactone (ALDACTONE) 50 MG tablet, Take 1 tablet by mouth Daily., Disp: 90 tablet, Rfl: 3    Tildrakizumab-asmn (IIUMYA) 100 MG/ML injection, Inject 1 mL under the skin into the appropriate area as directed Every 3 (Three) Months., Disp: , Rfl:     amLODIPine (NORVASC) 2.5 MG tablet, Take 1 tablet by mouth Daily., Disp: 90 tablet, Rfl: 3    apixaban (ELIQUIS) 5 MG tablet tablet, Take 1 tablet by mouth 2 (Two) Times a Day., Disp: 60 tablet, Rfl: 11     Objective   Vital Signs:  /62   Pulse (!) 47   Ht 185.4 cm (73\")   Wt 106 kg (233 lb)   SpO2 98%   BMI 30.74 kg/m²   Estimated body mass index is 30.74 kg/m² as calculated from the following:    Height as of this encounter: 185.4 cm (73\").    Weight as of this encounter: 106 kg (233 lb).      Constitutional:       Appearance: Healthy appearance. Not in distress.   Neck:      Vascular: JVD normal.   Pulmonary:      Effort: Pulmonary effort is normal.      Breath sounds: Normal breath sounds. No wheezing. No rhonchi. No rales.   Cardiovascular:      PMI at left midclavicular line. Normal rate. Regular rhythm. Normal S1. Normal S2.       Murmurs: There is no murmur.      No gallop.  No click. No rub.   Edema:     Peripheral edema absent.   Musculoskeletal: Normal range of motion.      Cervical back: Normal range of motion. Skin:     General: Skin is warm and dry.   Neurological:      Mental Status: Alert and oriented to person, place and time.        Result Review :  The following data was reviewed " by: Fransisco Gudino, DANII on 10/03/2024:  [facility]   Lipid Panel   BMP   Data reviewed : Cardiology studies echo      Results for orders placed during the hospital encounter of 02/05/24    Adult Transthoracic Echo Complete w/ Color, Spectral and Contrast if necessary per protocol    Interpretation Summary    Left ventricular systolic function is normal. Left ventricular ejection fraction appears to be 56 - 60%.    Left ventricular wall thickness is consistent with mild concentric hypertrophy.    Left ventricular diastolic function was normal.    Estimated right ventricular systolic pressure from tricuspid regurgitation is normal (<35 mmHg).         Assessment and Plan   Diagnoses and all orders for this visit:    1. Coronary artery disease involving native coronary artery of native heart without angina pectoris (Primary)  2. Stented coronary artery  3. Primary hypertension  4. Mixed hyperlipidemia  -No anginal symptoms as of late  - Continue aspirin 81 mg daily  - Continue Lipitor 40 mg nightly  - Blood pressure elevated today, likely due to stopping beta-blocker, will start amlodipine 2.5 mg daily (had previously been on this with no side effects)  - Continue Entresto 97/103 mg twice daily as well as spironolactone 50 mg daily for diastolic heart failure      5. PAF (paroxysmal atrial fibrillation)  6. Bifascicular block  7. Atypical atrial flutter  -Appears to be normal sinus rhythm on exam today  - Plans for ablation in the near future with electrophysiology  - Will continue on aspirin 81 mg at this time, however will plan to take Eliquis for 30 days following ablation  -  continue amiodarone 200 mg daily at this time    Addendum:  Spoke with electrophysiology team regarding the patient's Jardiance being stopped.  It appears this might of been miscommunication rather than a change in medical therapy.  We called and clarified with the patient that he had not been taking Jardiance since he last saw Dr. Cash on 9/17.  We  advised him to restart the Jardiance at 10 mg daily at this time for his GDMT.             Follow Up   Return in about 6 months (around 4/3/2025) for With Brianda .  Patient was given instructions and counseling regarding his condition or for health maintenance advice. Please see specific information pulled into the AVS if appropriate.       Part of this note may be an electronic transcription/translation of spoken language to printed text using the Dragon Dictation System.

## 2024-10-03 ENCOUNTER — OFFICE VISIT (OUTPATIENT)
Dept: CARDIOLOGY | Facility: CLINIC | Age: 84
End: 2024-10-03
Payer: MEDICARE

## 2024-10-03 VITALS
WEIGHT: 233 LBS | DIASTOLIC BLOOD PRESSURE: 62 MMHG | BODY MASS INDEX: 30.88 KG/M2 | SYSTOLIC BLOOD PRESSURE: 140 MMHG | HEIGHT: 73 IN | HEART RATE: 47 BPM | OXYGEN SATURATION: 98 %

## 2024-10-03 DIAGNOSIS — I10 PRIMARY HYPERTENSION: ICD-10-CM

## 2024-10-03 DIAGNOSIS — I48.4 ATYPICAL ATRIAL FLUTTER: ICD-10-CM

## 2024-10-03 DIAGNOSIS — E78.2 MIXED HYPERLIPIDEMIA: ICD-10-CM

## 2024-10-03 DIAGNOSIS — Z95.5 STENTED CORONARY ARTERY: ICD-10-CM

## 2024-10-03 DIAGNOSIS — I45.2 BIFASCICULAR BLOCK: ICD-10-CM

## 2024-10-03 DIAGNOSIS — I48.0 PAF (PAROXYSMAL ATRIAL FIBRILLATION): ICD-10-CM

## 2024-10-03 DIAGNOSIS — I25.10 CORONARY ARTERY DISEASE INVOLVING NATIVE CORONARY ARTERY OF NATIVE HEART WITHOUT ANGINA PECTORIS: Primary | ICD-10-CM

## 2024-10-03 PROBLEM — I48.19 ATRIAL FIBRILLATION, PERSISTENT: Status: ACTIVE | Noted: 2024-09-17

## 2024-10-03 LAB
QT INTERVAL: 484 MS
QTC INTERVAL: 432 MS

## 2024-10-03 PROCEDURE — 1160F RVW MEDS BY RX/DR IN RCRD: CPT

## 2024-10-03 PROCEDURE — 3078F DIAST BP <80 MM HG: CPT

## 2024-10-03 PROCEDURE — 99214 OFFICE O/P EST MOD 30 MIN: CPT

## 2024-10-03 PROCEDURE — 1159F MED LIST DOCD IN RCRD: CPT

## 2024-10-03 PROCEDURE — 3077F SYST BP >= 140 MM HG: CPT

## 2024-10-03 RX ORDER — AMLODIPINE BESYLATE 2.5 MG/1
2.5 TABLET ORAL DAILY
Qty: 90 TABLET | Refills: 3 | Status: SHIPPED | OUTPATIENT
Start: 2024-10-03

## 2024-10-28 ENCOUNTER — TELEPHONE (OUTPATIENT)
Dept: CARDIOLOGY | Facility: CLINIC | Age: 84
End: 2024-10-28
Payer: MEDICARE

## 2024-10-28 NOTE — TELEPHONE ENCOUNTER
Patient states since starting amiodarone his systolic number has increased. Average reading is in the 150's, highest reading 202. Patient states diastolic has been running in the 70's-80's.

## 2024-11-13 ENCOUNTER — TELEPHONE (OUTPATIENT)
Dept: CARDIOLOGY | Facility: CLINIC | Age: 84
End: 2024-11-13

## 2024-11-13 NOTE — TELEPHONE ENCOUNTER
Caller: [facility]     Relationship: Self    Best call back number: [phone]    What form or medical record are you requesting: [facility] NEEDS APPROVAL FOR A NERVE ABLASION ON BACK   PT SAW ORUC ON 09.17.2024 AND THEY ALSO NEED THAT OFFICE NOTE     Who is requesting this form or medical record from you: [facility]     How would you like to receive the form or medical records (pick-up, mail, fax): FAX  If fax, what is the fax number: [phone]  Timeframe paperwork needed: ASAP BY LEIA     Additional notes: PT IS HAVING SURGERY LEIA

## 2024-11-22 ENCOUNTER — TELEPHONE (OUTPATIENT)
Dept: CARDIOLOGY | Facility: CLINIC | Age: 84
End: 2024-11-22

## 2024-11-22 NOTE — TELEPHONE ENCOUNTER
Caller: JohnosnFred    Relationship: Self    Best call back number: [phone]     What is the best time to reach you: ANY    Who are you requesting to speak with (clinical staff, provider,  specific staff member): CLINICAL    Do you know the name of the person who called:     What was the call regarding: PATIENT CALLING IN DUE TO BLOOD PRESSURE BEING HIGH. PATIENT TOOK BLOOD PRESSURE THIS MORNING AND IT /88 P63 - YESTERDAY /93 P54 - WEDNESDAY /94 P55. PATIENT IS CONCERNED WITH THE HIGH BLOOD PRESSURE AND WOULD LIKE TO SPEAK TO SOMEONE CLINICAL ABOUT WHAT PATIENT SHOULD DO. PLEASE CALL PATIENT TO ADVISE. THANK YOU!    Is it okay if the provider responds through [employer]: NO

## 2024-11-22 NOTE — TELEPHONE ENCOUNTER
Patient states he does not have spironolactone 50 mg nor does he have amlodipine 2.5 mg. He is continuing to take metoprolol tartrate 25 mg bid. Med history tartrate was dc 11/7/22 and succinate was dc 9/30/24. It is noted on a phone call 10/28/24  to increase his amlodipine to 5 mg due to continued elevated BP, but the patient does not have the medication. Patient tells me he organizes his own medications.   [facility] the [facility], spoke with Valeria, pharmacy tech who states patient has not requested spironolactone since July. He was due to request in September. She is going to mail his spironolactone.She does not have prescriptions for amiodarone or amlodipine.  Valeria is going to reach out to his care team to see if they can get a  nurse to help dispense patient medications at home. [facility]. Valeria's line was cutting out so we ended the conversation.  2 weeks of amlodipine 2.5 mg and amiodarone 200 mg called in to [facility] in [place].Spoke with Shon, pharmacist.

## 2024-11-25 RX ORDER — AMLODIPINE BESYLATE 2.5 MG/1
2.5 TABLET ORAL DAILY
Qty: 90 TABLET | Refills: 3 | Status: SHIPPED | OUTPATIENT
Start: 2024-11-25

## 2024-11-25 RX ORDER — AMIODARONE HYDROCHLORIDE 200 MG/1
200 TABLET ORAL DAILY
Qty: 30 TABLET | Refills: 11 | Status: SHIPPED | OUTPATIENT
Start: 2024-11-25 | End: 2025-11-20

## 2025-02-04 ENCOUNTER — HOSPITAL ENCOUNTER (OUTPATIENT)
Dept: CARDIOLOGY | Facility: HOSPITAL | Age: 85
Setting detail: HOSPITAL OUTPATIENT SURGERY
Discharge: HOME | End: 2025-02-04
Admitting: STUDENT IN AN ORGANIZED HEALTH CARE EDUCATION/TRAINING PROGRAM
Payer: MEDICARE

## 2025-02-04 LAB
ANION GAP SERPL CALCULATED.3IONS-SCNC: 10 MMOL/L (ref 5–15)
BASOPHILS # BLD AUTO: 0.03 10*3/MM3 (ref 0–0.2)
BASOPHILS NFR BLD AUTO: 0.5 % (ref 0–1.5)
BUN SERPL-MCNC: 21 MG/DL (ref 8–23)
BUN/CREAT SERPL: 19.1 (ref 7–25)
CALCIUM SPEC-SCNC: 8.9 MG/DL (ref 8.6–10.5)
CHLORIDE SERPL-SCNC: 106 MMOL/L (ref 98–107)
CO2 SERPL-SCNC: 22 MMOL/L (ref 22–29)
CREAT SERPL-MCNC: 1.1 MG/DL (ref 0.76–1.27)
DEPRECATED RDW RBC AUTO: 42.1 FL (ref 37–54)
EGFRCR SERPLBLD CKD-EPI 2021: 66.2 ML/MIN/1.73
EOSINOPHIL # BLD AUTO: 0.16 10*3/MM3 (ref 0–0.4)
EOSINOPHIL NFR BLD AUTO: 2.6 % (ref 0.3–6.2)
ERYTHROCYTE [DISTWIDTH] IN BLOOD BY AUTOMATED COUNT: 11.6 % (ref 12.3–15.4)
GLUCOSE SERPL-MCNC: 147 MG/DL (ref 65–99)
HCT VFR BLD AUTO: 45.4 % (ref 37.5–51)
HGB BLD-MCNC: 15 G/DL (ref 13–17.7)
IMM GRANULOCYTES # BLD AUTO: 0.03 10*3/MM3 (ref 0–0.05)
IMM GRANULOCYTES NFR BLD AUTO: 0.5 % (ref 0–0.5)
INR PPP: 0.93 (ref 0.91–1.09)
LYMPHOCYTES # BLD AUTO: 1.73 10*3/MM3 (ref 0.7–3.1)
LYMPHOCYTES NFR BLD AUTO: 28.1 % (ref 19.6–45.3)
MCH RBC QN AUTO: 32.4 PG (ref 26.6–33)
MCHC RBC AUTO-ENTMCNC: 33 G/DL (ref 31.5–35.7)
MCV RBC AUTO: 98.1 FL (ref 79–97)
MONOCYTES # BLD AUTO: 0.56 10*3/MM3 (ref 0.1–0.9)
MONOCYTES NFR BLD AUTO: 9.1 % (ref 5–12)
NEUTROPHILS NFR BLD AUTO: 3.65 10*3/MM3 (ref 1.7–7)
NEUTROPHILS NFR BLD AUTO: 59.2 % (ref 42.7–76)
NRBC BLD AUTO-RTO: 0 /100 WBC (ref 0–0.2)
PLATELET # BLD AUTO: 187 10*3/MM3 (ref 140–450)
PMV BLD AUTO: 10.8 FL (ref 6–12)
POTASSIUM SERPL-SCNC: 5.1 MMOL/L (ref 3.5–5.2)
PROTHROMBIN TIME: 12.9 SECONDS (ref 11.8–14.8)
RBC # BLD AUTO: 4.63 10*6/MM3 (ref 4.14–5.8)
SODIUM SERPL-SCNC: 138 MMOL/L (ref 136–145)
WBC NRBC COR # BLD AUTO: 6.16 10*3/MM3 (ref 3.4–10.8)

## 2025-02-04 PROCEDURE — 93005 ELECTROCARDIOGRAM TRACING: CPT | Performed by: STUDENT IN AN ORGANIZED HEALTH CARE EDUCATION/TRAINING PROGRAM

## 2025-02-04 PROCEDURE — 85025 COMPLETE CBC W/AUTO DIFF WBC: CPT | Performed by: STUDENT IN AN ORGANIZED HEALTH CARE EDUCATION/TRAINING PROGRAM

## 2025-02-04 PROCEDURE — 85610 PROTHROMBIN TIME: CPT | Performed by: STUDENT IN AN ORGANIZED HEALTH CARE EDUCATION/TRAINING PROGRAM

## 2025-02-04 PROCEDURE — 80048 BASIC METABOLIC PNL TOTAL CA: CPT | Performed by: STUDENT IN AN ORGANIZED HEALTH CARE EDUCATION/TRAINING PROGRAM

## 2025-02-04 RX ORDER — IBUPROFEN 200 MG
200 TABLET ORAL EVERY 6 HOURS PRN
COMMUNITY

## 2025-02-06 LAB
QT INTERVAL: 486 MS
QT INTERVAL: 490 MS
QTC INTERVAL: 433 MS
QTC INTERVAL: 443 MS

## 2025-02-11 ENCOUNTER — TELEPHONE (OUTPATIENT)
Dept: CARDIOLOGY | Facility: CLINIC | Age: 85
End: 2025-02-11
Payer: MEDICARE

## 2025-02-11 NOTE — DISCHARGE INSTRUCTIONS
Post-Atrial Fibrillation Ablation Instructions    ACTIVITY    Avoid driving, operating machinery, or alcohol consumption for 24 hours after receiving sedation. In addition, avoid signing legal documents or participating in legal proceedings.    You may resume normal activities after 24 hours except for the following:    Avoid heavy lifting (no more than 10 pounds) and vigorous activities for a minimum of 7 days. This includes activities such as jogging, exercise classes, sports activities, etc.    You may resume walking and other normal activities.    Avoid sitting more than 2 consecutive hours during waking hours for the first 3 days. If you are traveling, stop for brief (5 minutes) walks every two hours.    MEDICATIONS  Continue Eliquis at your usual dose this evening. Do not stop this medication without consulting with your cardiologist.      MEDICAL FOLLOW UP   EP clinic follow up with Mazin Ni on 3/17/2025..    PLEASE NOTIFY US IF YOU DEVELOP    Fever of 101 or greater during your first few days at home    The occurrence of a new, persistent cough or unexplained shortness of breath.    A groin bruise may be expected. Initial soreness and discomfort should improve over the first few days. If you continue to have discomfort or if bruising is excessive, please call us.    Patients often experience palpitations and even atrial fibrillation during the healing period.    Please call if you have an episode of atrial fibrillation accompanied by fast heart rate greater than 120 beats per minute for extended period or Atrial Fibrillation that last longer than 1-2 days.    Mild chest soreness is common and may be treated with Tylenol, Advil, or Motrin.  This soreness typically worsens when taking deep breaths.  If you notice these symptoms, start one of these medications according to the package directions and continue for 2-3 days. If your symptoms are not relieved or become severe, please call us.      REASONS TO  GO TO THE EMERGENCY ROOM FOR EVALUATION:   Severe chest pain  Significant shortness of breath at rest  Near passing out or passing out episodes soon after your ablation  Symptoms of chest pain or shortness of breath associated with low blood pressure (reading less than 90 for the top number / systolic blood pressure)  Brisk bleeding or significant swelling from the groin where IVs were placed  Any concerns that an emergent or life threatening complication is occurring    If you have a clinical questions between the hours of 8am and 4pm, Monday - Friday please call the office and let us know your concern. Please leave a message if your call is not an emergency.    For emergencies, please go for evaluation at your nearest emergency department.    If you have a [facility] account, this an excellent way to communicate.  [facility] messages are checked Monday through Friday. Please allow 24-36 hours for your message to be answered.

## 2025-02-11 NOTE — TELEPHONE ENCOUNTER
I spoke with Mr. Johnson about his upcoming ablation.  He was well informed about the procedure from prior discussion with Dr. Cash.  We discussed the procedure at length including risks, anesthesia, intra-op procedures, recovery, bedrest, sheath removal, discharge criteria, and normal post-procedure expectations.  I answered a few remaining questions. Mr. Johnson verbalized understanding and he is ready to proceed.       Rosemary Garcia RN

## 2025-02-12 ENCOUNTER — ANESTHESIA EVENT (OUTPATIENT)
Dept: CARDIOLOGY | Facility: HOSPITAL | Age: 85
End: 2025-02-12
Payer: OTHER GOVERNMENT

## 2025-02-12 ENCOUNTER — ANESTHESIA (OUTPATIENT)
Dept: CARDIOLOGY | Facility: HOSPITAL | Age: 85
End: 2025-02-12
Payer: OTHER GOVERNMENT

## 2025-02-12 ENCOUNTER — HOSPITAL ENCOUNTER (OUTPATIENT)
Facility: HOSPITAL | Age: 85
Setting detail: HOSPITAL OUTPATIENT SURGERY
Discharge: HOME | End: 2025-02-12
Attending: STUDENT IN AN ORGANIZED HEALTH CARE EDUCATION/TRAINING PROGRAM | Admitting: STUDENT IN AN ORGANIZED HEALTH CARE EDUCATION/TRAINING PROGRAM
Payer: OTHER GOVERNMENT

## 2025-02-12 VITALS
HEIGHT: 73 IN | RESPIRATION RATE: 16 BRPM | WEIGHT: 244.4 LBS | DIASTOLIC BLOOD PRESSURE: 61 MMHG | HEART RATE: 46 BPM | BODY MASS INDEX: 32.39 KG/M2 | OXYGEN SATURATION: 99 % | SYSTOLIC BLOOD PRESSURE: 123 MMHG

## 2025-02-12 DIAGNOSIS — I48.19 ATRIAL FIBRILLATION, PERSISTENT: ICD-10-CM

## 2025-02-12 LAB
ACT BLD: 262 SECONDS (ref 82–152)
ACT BLD: 343 SECONDS (ref 82–152)
ACT BLD: 492 SECONDS (ref 82–152)

## 2025-02-12 PROCEDURE — C1759 CATH, INTRA ECHOCARDIOGRAPHY: HCPCS | Performed by: STUDENT IN AN ORGANIZED HEALTH CARE EDUCATION/TRAINING PROGRAM

## 2025-02-12 PROCEDURE — C1766 INTRO/SHEATH,STRBLE,NON-PEEL: HCPCS | Performed by: STUDENT IN AN ORGANIZED HEALTH CARE EDUCATION/TRAINING PROGRAM

## 2025-02-12 PROCEDURE — 25010000002 PROTAMINE SULFATE PER 10 MG

## 2025-02-12 PROCEDURE — L1830 KO IMMOB CANVAS LONG PRE OTS: HCPCS | Performed by: STUDENT IN AN ORGANIZED HEALTH CARE EDUCATION/TRAINING PROGRAM

## 2025-02-12 PROCEDURE — C1894 INTRO/SHEATH, NON-LASER: HCPCS | Performed by: STUDENT IN AN ORGANIZED HEALTH CARE EDUCATION/TRAINING PROGRAM

## 2025-02-12 PROCEDURE — 93656 COMPRE EP EVAL ABLTJ ATR FIB: CPT | Performed by: STUDENT IN AN ORGANIZED HEALTH CARE EDUCATION/TRAINING PROGRAM

## 2025-02-12 PROCEDURE — 25010000002 ONDANSETRON PER 1 MG

## 2025-02-12 PROCEDURE — 93622 COMP EP EVAL L VENTR PAC&REC: CPT | Performed by: STUDENT IN AN ORGANIZED HEALTH CARE EDUCATION/TRAINING PROGRAM

## 2025-02-12 PROCEDURE — 93005 ELECTROCARDIOGRAM TRACING: CPT | Performed by: STUDENT IN AN ORGANIZED HEALTH CARE EDUCATION/TRAINING PROGRAM

## 2025-02-12 PROCEDURE — 25010000002 GLYCOPYRROLATE 0.4 MG/2ML SOLUTION

## 2025-02-12 PROCEDURE — C1730 CATH, EP, 19 OR FEW ELECT: HCPCS | Performed by: STUDENT IN AN ORGANIZED HEALTH CARE EDUCATION/TRAINING PROGRAM

## 2025-02-12 PROCEDURE — 25010000002 VASOPRESSIN 20 UNIT/ML SOLUTION

## 2025-02-12 PROCEDURE — 93010 ELECTROCARDIOGRAM REPORT: CPT | Performed by: HOSPITALIST

## 2025-02-12 PROCEDURE — 25010000002 FENTANYL CITRATE (PF) 100 MCG/2ML SOLUTION

## 2025-02-12 PROCEDURE — 25810000003 SODIUM CHLORIDE 0.9 % SOLUTION

## 2025-02-12 PROCEDURE — C1760 CLOSURE DEV, VASC: HCPCS | Performed by: STUDENT IN AN ORGANIZED HEALTH CARE EDUCATION/TRAINING PROGRAM

## 2025-02-12 PROCEDURE — 25010000002 LIDOCAINE 2% SOLUTION: Performed by: STUDENT IN AN ORGANIZED HEALTH CARE EDUCATION/TRAINING PROGRAM

## 2025-02-12 PROCEDURE — S0260 H&P FOR SURGERY: HCPCS | Performed by: STUDENT IN AN ORGANIZED HEALTH CARE EDUCATION/TRAINING PROGRAM

## 2025-02-12 PROCEDURE — 25010000002 PROPOFOL 10 MG/ML EMULSION

## 2025-02-12 PROCEDURE — 85347 COAGULATION TIME ACTIVATED: CPT

## 2025-02-12 PROCEDURE — C1733 CATH, EP, OTHR THAN COOL-TIP: HCPCS | Performed by: STUDENT IN AN ORGANIZED HEALTH CARE EDUCATION/TRAINING PROGRAM

## 2025-02-12 PROCEDURE — 25010000002 HEPARIN (PORCINE) PER 1000 UNITS

## 2025-02-12 PROCEDURE — C1732 CATH, EP, DIAG/ABL, 3D/VECT: HCPCS | Performed by: STUDENT IN AN ORGANIZED HEALTH CARE EDUCATION/TRAINING PROGRAM

## 2025-02-12 RX ORDER — HEPARIN SODIUM 1000 [USP'U]/ML
INJECTION, SOLUTION INTRAVENOUS; SUBCUTANEOUS AS NEEDED
Status: DISCONTINUED | OUTPATIENT
Start: 2025-02-12 | End: 2025-02-12 | Stop reason: SURG

## 2025-02-12 RX ORDER — LIDOCAINE HYDROCHLORIDE 20 MG/ML
INJECTION, SOLUTION INFILTRATION; PERINEURAL
Status: DISCONTINUED | OUTPATIENT
Start: 2025-02-12 | End: 2025-02-12 | Stop reason: HOSPADM

## 2025-02-12 RX ORDER — BUPIVACAINE HCL/0.9 % NACL/PF 0.125 %
PLASTIC BAG, INJECTION (ML) EPIDURAL AS NEEDED
Status: DISCONTINUED | OUTPATIENT
Start: 2025-02-12 | End: 2025-02-12 | Stop reason: SURG

## 2025-02-12 RX ORDER — SODIUM CHLORIDE 0.9 % (FLUSH) 0.9 %
10 SYRINGE (ML) INJECTION AS NEEDED
Status: DISCONTINUED | OUTPATIENT
Start: 2025-02-12 | End: 2025-02-12 | Stop reason: HOSPADM

## 2025-02-12 RX ORDER — SODIUM CHLORIDE 9 MG/ML
40 INJECTION, SOLUTION INTRAVENOUS AS NEEDED
Status: DISCONTINUED | OUTPATIENT
Start: 2025-02-12 | End: 2025-02-12 | Stop reason: HOSPADM

## 2025-02-12 RX ORDER — NEOSTIGMINE METHYLSULFATE 5 MG/5 ML
SYRINGE (ML) INTRAVENOUS AS NEEDED
Status: DISCONTINUED | OUTPATIENT
Start: 2025-02-12 | End: 2025-02-12 | Stop reason: SURG

## 2025-02-12 RX ORDER — FENTANYL CITRATE 50 UG/ML
INJECTION, SOLUTION INTRAMUSCULAR; INTRAVENOUS AS NEEDED
Status: DISCONTINUED | OUTPATIENT
Start: 2025-02-12 | End: 2025-02-12 | Stop reason: SURG

## 2025-02-12 RX ORDER — PROTAMINE SULFATE 10 MG/ML
INJECTION, SOLUTION INTRAVENOUS AS NEEDED
Status: DISCONTINUED | OUTPATIENT
Start: 2025-02-12 | End: 2025-02-12 | Stop reason: SURG

## 2025-02-12 RX ORDER — SODIUM CHLORIDE 9 MG/ML
INJECTION, SOLUTION INTRAVENOUS CONTINUOUS PRN
Status: DISCONTINUED | OUTPATIENT
Start: 2025-02-12 | End: 2025-02-12 | Stop reason: SURG

## 2025-02-12 RX ORDER — PROPOFOL 10 MG/ML
VIAL (ML) INTRAVENOUS AS NEEDED
Status: DISCONTINUED | OUTPATIENT
Start: 2025-02-12 | End: 2025-02-12 | Stop reason: SURG

## 2025-02-12 RX ORDER — ONDANSETRON 2 MG/ML
INJECTION INTRAMUSCULAR; INTRAVENOUS AS NEEDED
Status: DISCONTINUED | OUTPATIENT
Start: 2025-02-12 | End: 2025-02-12 | Stop reason: SURG

## 2025-02-12 RX ORDER — ROCURONIUM BROMIDE 10 MG/ML
INJECTION, SOLUTION INTRAVENOUS AS NEEDED
Status: DISCONTINUED | OUTPATIENT
Start: 2025-02-12 | End: 2025-02-12 | Stop reason: SURG

## 2025-02-12 RX ORDER — SODIUM CHLORIDE 0.9 % (FLUSH) 0.9 %
10 SYRINGE (ML) INJECTION EVERY 12 HOURS SCHEDULED
Status: DISCONTINUED | OUTPATIENT
Start: 2025-02-12 | End: 2025-02-12 | Stop reason: HOSPADM

## 2025-02-12 RX ORDER — EPHEDRINE SULFATE 50 MG/ML
INJECTION INTRAVENOUS AS NEEDED
Status: DISCONTINUED | OUTPATIENT
Start: 2025-02-12 | End: 2025-02-12 | Stop reason: SURG

## 2025-02-12 RX ADMIN — Medication 300 MCG: at 08:09

## 2025-02-12 RX ADMIN — EPHEDRINE SULFATE 30 MG: 50 INJECTION INTRAVENOUS at 08:22

## 2025-02-12 RX ADMIN — HEPARIN SODIUM 10000 UNITS: 1000 INJECTION, SOLUTION INTRAVENOUS; SUBCUTANEOUS at 09:01

## 2025-02-12 RX ADMIN — HEPARIN SODIUM 10000 UNITS: 1000 INJECTION, SOLUTION INTRAVENOUS; SUBCUTANEOUS at 08:29

## 2025-02-12 RX ADMIN — FENTANYL CITRATE 50 MCG: 50 INJECTION, SOLUTION INTRAMUSCULAR; INTRAVENOUS at 07:56

## 2025-02-12 RX ADMIN — SODIUM CHLORIDE: 9 INJECTION, SOLUTION INTRAVENOUS at 07:51

## 2025-02-12 RX ADMIN — PROPOFOL 120 MG: 10 INJECTION, EMULSION INTRAVENOUS at 07:51

## 2025-02-12 RX ADMIN — GLYCOPYRROLATE 0.4 MG: 0.2 INJECTION INTRAMUSCULAR; INTRAVENOUS at 09:20

## 2025-02-12 RX ADMIN — EPHEDRINE SULFATE 20 MG: 50 INJECTION INTRAVENOUS at 08:16

## 2025-02-12 RX ADMIN — EPHEDRINE SULFATE 30 MG: 50 INJECTION INTRAVENOUS at 10:19

## 2025-02-12 RX ADMIN — PROPOFOL 120 MCG/KG/MIN: 10 INJECTION, EMULSION INTRAVENOUS at 08:51

## 2025-02-12 RX ADMIN — Medication 300 MCG: at 08:19

## 2025-02-12 RX ADMIN — PROPOFOL 130 MCG/KG/MIN: 10 INJECTION, EMULSION INTRAVENOUS at 07:52

## 2025-02-12 RX ADMIN — Medication 200 MCG: at 08:04

## 2025-02-12 RX ADMIN — PROTAMINE SULFATE 50 MG: 10 INJECTION, SOLUTION INTRAVENOUS at 09:17

## 2025-02-12 RX ADMIN — ONDANSETRON 4 MG: 2 INJECTION INTRAMUSCULAR; INTRAVENOUS at 09:17

## 2025-02-12 RX ADMIN — ROCURONIUM 50 MG: 50 INJECTION, SOLUTION INTRAVENOUS at 07:51

## 2025-02-12 RX ADMIN — Medication 3 MG: at 09:20

## 2025-02-12 RX ADMIN — FENTANYL CITRATE 50 MCG: 50 INJECTION, SOLUTION INTRAMUSCULAR; INTRAVENOUS at 07:51

## 2025-02-12 RX ADMIN — HEPARIN SODIUM 20000 UNITS: 1000 INJECTION, SOLUTION INTRAVENOUS; SUBCUTANEOUS at 08:13

## 2025-02-12 NOTE — ANESTHESIA PROCEDURE NOTES
Airway  Urgency: elective    Date/Time: 2/12/2025 7:51 AM  Airway not difficult    General Information and Staff    Patient location during procedure: OR  CRNA/CAA: Derik Cobb CRNA    Indications and Patient Condition  Indications for airway management: airway protection    Preoxygenated: yes  Mask difficulty assessment: 1 - vent by mask    Final Airway Details  Final airway type: endotracheal airway      Successful airway: ETT  Cuffed: yes   Successful intubation technique: direct laryngoscopy  Facilitating devices/methods: intubating stylet  Blade: Traylor  Blade size: 2  ETT size (mm): 7.5  Cormack-Lehane Classification: grade I - full view of glottis  Placement verified by: chest auscultation and capnometry   Measured from: lips  ETT/EBT  to lips (cm): 21  Number of attempts at approach: 1  Assessment: lips, teeth, and gum same as pre-op and atraumatic intubation

## 2025-02-12 NOTE — H&P
"Chief Complaint  atrial fibrillation and atrial flutter    Subjective        History of Present Illness    EP Problems:  1.  Paroxysmal atrial fibrillation  2.  Presence of a watchman closure device  3.  Bifascicular block  4.  Atypical atrial flutter  5.  Frequent PACs  6.  Frequent PVCs     Cardiology Problems:  1.  CAD status post PCI  2.  Hypertension  3.  Hyperlipidemia     Medical Problems:  1.  Obstructive sleep apnea  2.  Raynaud's disease    Fred Johnson is a 84 y.o. male with past medical history as above who presents to the hospital for outpatient EP study and ablation for treatment of atrial fibrillation and atrial flutter.  He has a history of symptomatic atrial fibrillation and atrial flutter.  He underwent AF ablation in 2019 but continues to have breakthrough episodes.  We discussed treatment options in the clinic including AAD use and ablation. After risk-benefit discussion, he chose to proceed with an ablation.    Since the time of the last clinic visit, denies significant change in symptoms.  Denies missing any doses of his medications.  No new ER visits or hospitalizations.      Allergies:  Percocet [oxycodone-acetaminophen]      Objective   Vital Signs:  There were no vitals taken for this visit.  Estimated body mass index is 30.74 kg/m² as calculated from the following:    Height as of 10/3/24: 185.4 cm (73\").    Weight as of 10/3/24: 106 kg (233 lb).      Physical Exam  Vitals reviewed.   Constitutional:       Appearance: Normal appearance.   Cardiovascular:      Rate and Rhythm: Regular rhythm. Bradycardia present.      Pulses: Normal pulses.      Heart sounds: Normal heart sounds.   Pulmonary:      Effort: Pulmonary effort is normal.      Breath sounds: Normal breath sounds.   Musculoskeletal:         General: No swelling.   Neurological:      Mental Status: He is alert.   Psychiatric:         Mood and Affect: Mood normal.         Judgment: Judgment normal.            Result Review :  Labs " were reviewed with relevant findings as follows: No relevant findings               Assessment and Plan   Assessment/plan:  Fred Johnson is a 84 y.o. male who presents to the hospital for outpatient EP study and ablation for treatment of atrial fibrillation and atrial flutter.  There are no apparent contraindications to proceeding and he is medically appropriate for outpatient management with this procedure.      I have previously discussed and again recapitulated risks, benefits, and alternatives of an electrophysiology study and ablation for atrial fibrillation and atrial flutter with the patient.  Alternatives discussed include continued observation and medical management.  An electrophysiology study with ablation is an inherently high risk procedure with possible complications that include but are not limited to vascular access complications, internal bleeding, tamponade requiring pericardiocentesis or cardiac surgery, stroke, MI, embolism, myocardial injury, injury to the normal conduction system requiring a pacemaker, and ultimately death amongst others.  We also discussed that given the nature of the procedure, therapeutic efficacy can be variable.  Possibilities of recurrent arrhythmias and the possible need for additional procedures and/or medical therapy was discussed. Questions asked were appropriately answered.  No guarantees were made or implied.    Despite this, they would still like to proceed.    Plan:   - Proceed with EP study and ablation            Part of this note may be an electronic transcription/translation of spoken language to printed text using the Dragon Dictation System.

## 2025-02-12 NOTE — Clinical Note
Hemostasis started on the right femoral vein. Vascade MVP was used in achieving hemostasis. Closure device deployed in the vessel. Hemostasis achieved successfully. Closure device additional comment: 10 Fr

## 2025-02-12 NOTE — ANESTHESIA PREPROCEDURE EVALUATION
Anesthesia Evaluation     Patient summary reviewed and Nursing notes reviewed   NPO Solid Status: > 8 hours  NPO Liquid Status: > 8 hours           Airway   Mallampati: II  TM distance: >3 FB  Neck ROM: full  No difficulty expected  Dental - normal exam     Pulmonary    (+) ,shortness of breath, sleep apnea  Cardiovascular   Exercise tolerance: good (4-7 METS)    ECG reviewed  PT is on anticoagulation therapy  Beta blocker given within 24 hours of surgery    (+) hypertension, CAD, cardiac stents , dysrhythmias Atrial Fib, CHF Diastolic >=55%, MUIR, hyperlipidemia      Neuro/Psych  GI/Hepatic/Renal/Endo    (+) obesity, morbid obesity    Musculoskeletal     Abdominal    Substance History      OB/GYN          Other                    Anesthesia Plan    ASA 3     general       Anesthetic plan, risks, benefits, and alternatives have been provided, discussed and informed consent has been obtained with: patient.    Plan discussed with CRNA.    CODE STATUS:

## 2025-02-12 NOTE — ANESTHESIA POSTPROCEDURE EVALUATION
"Patient: Fred Johnson    Procedure Summary       Date: 02/12/25 Room / Location: [facility]ATH [facility] 4 /  [facility] CATH INVASIVE LOCATION    Anesthesia Start: 0747 Anesthesia Stop: 0939    Procedure: Ablation atrial fibrillation - PFA Diagnosis:       Atrial fibrillation, persistent      (Atrial fibrillation (62424) and atrial flutter (+58249))    Providers: Gonzalo Cash MD Provider: Derik Cobb CRNA    Anesthesia Type: general ASA Status: 3            Anesthesia Type: general    Vitals  Vitals Value Taken Time   /66 02/12/25 1101   Temp     Pulse 58 02/12/25 1116   Resp 22 02/12/25 1015   SpO2 98 % 02/12/25 1116   Vitals shown include unfiled device data.        Post Anesthesia Care and Evaluation    Patient location during evaluation: PHASE II  Patient participation: complete - patient participated  Level of consciousness: awake and awake and alert  Pain score: 0  Pain management: adequate    Airway patency: patent  Anesthetic complications: No anesthetic complications  PONV Status: none  Cardiovascular status: acceptable  Respiratory status: acceptable  Hydration status: acceptable    Comments: Patient discharged according to acceptable Shivam score per RN assessment. See nursing records for further information.     Blood pressure 123/70, pulse 52, resp. rate 22, height 185.4 cm (73\"), weight 111 kg (244 lb 6.4 oz), SpO2 94%.      "

## 2025-02-12 NOTE — Clinical Note
Hemostasis started on the right femoral vein. Vascade MVP was used in achieving hemostasis. Closure device deployed in the vessel. Hemostasis achieved successfully. Closure device additional comment: 8Fr

## 2025-02-13 LAB
QT INTERVAL: 508 MS
QTC INTERVAL: 476 MS

## 2025-03-17 ENCOUNTER — OFFICE VISIT (OUTPATIENT)
Dept: CARDIOLOGY | Facility: CLINIC | Age: 85
End: 2025-03-17
Payer: OTHER GOVERNMENT

## 2025-03-17 VITALS
SYSTOLIC BLOOD PRESSURE: 116 MMHG | HEIGHT: 73 IN | DIASTOLIC BLOOD PRESSURE: 70 MMHG | HEART RATE: 60 BPM | BODY MASS INDEX: 32.6 KG/M2 | OXYGEN SATURATION: 96 % | WEIGHT: 246 LBS

## 2025-03-17 DIAGNOSIS — I48.0 PAROXYSMAL A-FIB: Primary | ICD-10-CM

## 2025-03-17 DIAGNOSIS — I45.2 BIFASCICULAR BLOCK: ICD-10-CM

## 2025-03-17 DIAGNOSIS — I48.4 ATYPICAL ATRIAL FLUTTER: ICD-10-CM

## 2025-03-17 RX ORDER — AMIODARONE HYDROCHLORIDE 200 MG/1
200 TABLET ORAL DAILY
Qty: 90 TABLET | Refills: 11 | Status: SHIPPED | OUTPATIENT
Start: 2025-03-17

## 2025-03-17 RX ORDER — SILDENAFIL 100 MG/1
100 TABLET, FILM COATED ORAL AS NEEDED
COMMUNITY
Start: 2025-02-11

## 2025-03-17 RX ORDER — AMIODARONE HYDROCHLORIDE 200 MG/1
200 TABLET ORAL DAILY
COMMUNITY
End: 2025-03-17 | Stop reason: SDUPTHER

## 2025-03-17 NOTE — PROGRESS NOTES
"EP FOLLOW UP PATIENT VISIT    Chief Complaint  PAF (paroxysmal atrial fibrillation) (4 wk s/p ablation 02/12/25)    Subjective        History of Present Illness    EP Problems:  1.  Paroxysmal atrial fibrillation  - 2/12/2025: PVI  2.  Presence of a watchman closure device  3.  Bifascicular block  4.  Atypical atrial flutter  5.  Frequent PACs  6.  Frequent PVCs     Cardiology Problems:  1.  CAD status post PCI  2.  Hypertension  3.  Hyperlipidemia     Medical Problems:  1.  Obstructive sleep apnea  2.  Raynaud's disease    Patient is an 84-year-old female who presents to the clinic today for follow-up visit of his paroxysmal atrial fibrillation, atypical atrial flutter, and bifascicular block. Patient underwent a successful atrial fibrillation ablation 2/12/2025. During the procedure there was evidence of a prior CTI ablation and there was no other inducible atrial flutter at the time. He says that he is doing well today. Patient says that his groin site is healed up well. Patient has a watchman closure device and is currently taking Eliquis during the post ablation period. He denies any bleeding issues. Patient says that he was asymptomatic prior to the ablation and denies any new symptoms. Patient said that he was like to begin going back to the gym but is unsure if it is safe for him to start. Patient is also been watching what he eats but does not feel like it has help with weight loss.      Objective   Vital Signs:  /70 (BP Location: Left arm, Patient Position: Sitting, Cuff Size: Adult)   Pulse 60   Ht 185.4 cm (72.99\")   Wt 112 kg (246 lb)   SpO2 96%   BMI 32.46 kg/m²   Estimated body mass index is 32.46 kg/m² as calculated from the following:    Height as of this encounter: 185.4 cm (72.99\").    Weight as of this encounter: 112 kg (246 lb).      Physical Exam  Vitals reviewed.   Constitutional:       Appearance: Normal appearance. He is obese.   Cardiovascular:      Rate and Rhythm: Normal rate " and regular rhythm.      Pulses: Normal pulses.           Radial pulses are 2+ on the right side and 2+ on the left side.        Posterior tibial pulses are 2+ on the right side and 2+ on the left side.      Heart sounds: Normal heart sounds.   Pulmonary:      Effort: Pulmonary effort is normal.      Breath sounds: Normal breath sounds.   Musculoskeletal:      Right lower leg: No edema.      Left lower leg: No edema.   Skin:     General: Skin is warm and dry.   Neurological:      Mental Status: He is alert.              Result Review :  The following data was reviewed by: DANII العراقي on 03/17/2025:  CMP          2/4/2025    09:16   CMP   Glucose 147    BUN 21    Creatinine 1.10    EGFR 66.2    Sodium 138    Potassium 5.1    Chloride 106    Calcium 8.9    BUN/Creatinine Ratio 19.1    Anion Gap 10.0      CBC          2/4/2025    09:16   CBC   WBC 6.16    RBC 4.63    Hemoglobin 15.0    Hematocrit 45.4    MCV 98.1    MCH 32.4    MCHC 33.0    RDW 11.6    Platelets 187        [id number] SCORE   [id number] Score: 5 (3/17/2025 10:10 AM)      ECG 12 Lead    Date/Time: 3/17/2025 10:10 AM  Performed by: Mazin Ni APRN    Authorized by: Mazin Ni APRN  Comparison: compared with previous ECG from 2/12/2025  Rhythm: sinus rhythm  Rate: normal  Conduction: right bundle branch block, left anterior fascicular block and bifascicular block  QRS axis: left              Assessment and Plan   Diagnoses and all orders for this visit:    1. Paroxysmal A-fib (Primary)  -     amiodarone (PACERONE) 200 MG tablet; Take 1 tablet by mouth Daily.  Dispense: 90 tablet; Refill: 11  -     ECG 12 Lead    2. Atypical atrial flutter  -     amiodarone (PACERONE) 200 MG tablet; Take 1 tablet by mouth Daily.  Dispense: 90 tablet; Refill: 11  -     ECG 12 Lead    3. Bifascicular block  -     ECG 12 Lead          Plan:  - Patient has Watchman device. Will need to continue Eliquis until 90 days post ablation. Patient may stop taking  Eliquis may 12, 2025.  - Continue amiodarone for now. Will most likely discontinue at next visit in 3 months if no reoccurrence.  - No restrictions from an EP standpoint. Patient may go to the gym. Recommend an average of 30 days of exercise daily.  - Recommend patient start calorie counting to assist with dieting.             Follow Up   Return in about 3 months (around 6/17/2025).  Patient was given instructions and counseling regarding his condition or for health maintenance advice. Please see specific information pulled into the AVS if appropriate.     Part of this note may be an electronic transcription/translation of spoken language to printed text using the Dragon Dictation System.

## 2025-04-03 NOTE — PROGRESS NOTES
Chief Complaint  Coronary Artery Disease (6mo F/U), Paroxysmal afib, and Hypertension    Subjective          Fred Johnson presents to [facility] for routine follow-up.  He was last seen in our office on 10/3/2024 at which time he was started on amlodipine 2.5 mg daily due to uncontrolled hypertension.  He has coronary artery disease status post stents x5 with most recent being in 2019, paroxysmal atrial fibrillation/flutter status post ablation and watchman left atrial appendage closure device in 2019 with subsequent A-fib ablation and PVI on 2/12/2025, left ventricular hypertrophy, chronic diastolic congestive heart failure, pulmonary hypertension, hypertension, hyperlipidemia, right bundle branch block, obstructive sleep apnea and obesity.  He complains of chronic dyspnea with moderate to heavy exertion. He reports improvement in edema and abdominal bloating. He complains of increased dizziness and fatigue since his ablation 2/12/25. Patient denies chest pain, palpitations, syncope, orthopnea, or PND.  Patient denies any signs of bleeding.    Coronary Artery Disease  Presents for follow-up visit. Symptoms include dizziness and shortness of breath. Pertinent negatives include no chest pain, chest pressure, chest tightness, leg swelling, muscle weakness, palpitations or weight gain. Risk factors include hyperlipidemia and obesity. Risk factors do not include hypertension. His past medical history is significant for CHF. The symptoms have been stable. Compliance with diet is variable. Compliance with exercise is variable. Compliance with medications is good.   Atrial Fibrillation  Presents for follow-up visit. Symptoms include dizziness, shortness of breath and weakness. Symptoms are negative for an AICD problem, bradycardia, chest pain, hemodynamic instability, hypertension, hypotension, pacemaker problem, palpitations, syncope and tachycardia. The symptoms have been stable. Past  medical history includes atrial fibrillation, CAD, CHF and hyperlipidemia.   Hypertension  This is a chronic problem. The current episode started more than 1 year ago. The problem is uncontrolled. Associated symptoms include malaise/fatigue and shortness of breath. Pertinent negatives include no anxiety, blurred vision, chest pain, headaches, neck pain, orthopnea, palpitations, peripheral edema, PND or sweats. Risk factors for coronary artery disease include male gender, obesity and dyslipidemia. Past treatments include beta blockers. Current antihypertension treatment includes calcium channel blockers, diuretics and angiotensin blockers. The current treatment provides significant improvement. Hypertensive end-organ damage includes CAD/MI, heart failure and left ventricular hypertrophy.   Hyperlipidemia  This is a chronic problem. The current episode started more than 1 year ago. Exacerbating diseases include obesity. Associated symptoms include shortness of breath. Pertinent negatives include no chest pain. Current antihyperlipidemic treatment includes statins. Risk factors for coronary artery disease include hypertension, male sex, obesity and dyslipidemia.   Congestive Heart Failure  Presents for follow-up visit. Associated symptoms include edema, fatigue and shortness of breath. Pertinent negatives include no abdominal pain, chest pain, chest pressure, claudication, muscle weakness, near-syncope, nocturia, orthopnea, palpitations, paroxysmal nocturnal dyspnea or unexpected weight change. The symptoms have been stable. His past medical history is significant for CAD. Compliance with total regimen is 51-75%. Compliance with diet is 51-75%. Compliance with exercise is 51-75%. Compliance with medications is %.     I have reviewed and confirmed the accuracy of the ROS  DANII Tsang    Objective     Current Outpatient Medications:     amiodarone (PACERONE) 200 MG tablet, Take 1 tablet by mouth Daily.,  "Disp: 90 tablet, Rfl: 3    amLODIPine (NORVASC) 2.5 MG tablet, Take 1 tablet by mouth Daily., Disp: 90 tablet, Rfl: 3    apixaban (ELIQUIS) 5 MG tablet tablet, Take 1 tablet by mouth 2 (Two) Times a Day., Disp: 60 tablet, Rfl: 3    aspirin 81 MG EC tablet, Take 1 tablet by mouth Daily., Disp: , Rfl:     atorvastatin (LIPITOR) 40 MG tablet, Take 1 tablet by mouth Daily., Disp: 90 tablet, Rfl: 3    cholecalciferol (VITAMIN D3) 25 MCG (1000 UT) tablet, Take 1 tablet by mouth Daily., Disp: , Rfl:     ibuprofen (ADVIL,MOTRIN) 200 MG tablet, Take 1 tablet by mouth Every 6 (Six) Hours As Needed for Mild Pain., Disp: , Rfl:     Loratadine (CLARITIN PO), Take  by mouth., Disp: , Rfl:     nitroglycerin (NITROSTAT) 0.4 MG SL tablet, 1 under the tongue as needed for angina, may repeat q5mins for up three doses, Disp: 25 tablet, Rfl: 11    sacubitril-valsartan (Entresto)  MG tablet, Take 1 tablet by mouth 2 (Two) Times a Day., Disp: 180 tablet, Rfl: 3    sildenafil (VIAGRA) 100 MG tablet, Take 1 tablet by mouth As Needed for Erectile Dysfunction., Disp: , Rfl:     spironolactone (ALDACTONE) 50 MG tablet, Take 1 tablet by mouth Daily., Disp: 90 tablet, Rfl: 3    Testosterone Cypionate (DEPOTESTOTERONE CYPIONATE) 200 MG/ML injection, INJECT 1/2 (ONE-HALF) ML INTRAMUSCULARLY TWICE A WEEK, Disp: , Rfl:     Tildrakizumab-asmn (IIUMYA) 100 MG/ML injection, Inject 1 mL under the skin into the appropriate area as directed Every 3 (Three) Months., Disp: , Rfl:     empagliflozin (Jardiance) 10 MG tablet tablet, Take 1 tablet by mouth Daily., Disp: 90 tablet, Rfl: 3  Vital Signs:   /69   Pulse (!) 49   Ht 185.4 cm (73\")   Wt 105 kg (232 lb)   SpO2 100%   BMI 30.61 kg/m²     Vitals and nursing note reviewed.   Constitutional:       General: Awake.      Appearance: Normal and healthy appearance. Well-developed and not in distress. Obese.   Eyes:      General: Lids are normal.      Conjunctiva/sclera: Conjunctivae normal.     "  Pupils: Pupils are equal, round, and reactive to light.   HENT:      Head: Normocephalic and atraumatic.      Nose: Nose normal.   Neck:      Vascular: No JVR. JVD normal.   Pulmonary:      Effort: Pulmonary effort is normal.      Breath sounds: Normal breath sounds. No decreased breath sounds. No wheezing. No rhonchi. No rales.   Chest:      Chest wall: Not tender to palpatation.   Cardiovascular:      PMI at left midclavicular line. Bradycardia present. Regular rhythm. Normal S1. Normal S2.       Murmurs: There is no murmur.      No gallop.  No click. No rub.   Pulses:     Intact distal pulses.   Edema:     Peripheral edema absent.   Abdominal:      General: Bowel sounds are normal.      Palpations: Abdomen is soft.      Tenderness: There is no abdominal tenderness.   Musculoskeletal: Normal range of motion.         General: No tenderness.      Cervical back: Normal range of motion. Skin:     General: Skin is warm and dry.   Neurological:      General: No focal deficit present.      Mental Status: Alert, oriented to person, place, and time and oriented to person, place and time.   Psychiatric:         Attention and Perception: Attention and perception normal.         Mood and Affect: Mood and affect normal.         Speech: Speech normal.         Behavior: Behavior normal. Behavior is cooperative.         Thought Content: Thought content normal.         Cognition and Memory: Cognition and memory normal.         Judgment: Judgment normal.        Result Review :   The following data was reviewed by: DANII Tsang on 04/04/2025  Common labs          2/4/2025    09:16   Common Labs   Glucose 147    BUN 21    Creatinine 1.10    Sodium 138    Potassium 5.1    Chloride 106    Calcium 8.9    WBC 6.16    Hemoglobin 15.0    Hematocrit 45.4    Platelets 187        Data reviewed: Cardiology studies 2d echo 2/16/22 and 2/5/24           Assessment and Plan    Diagnoses and all orders for this visit:    1. Coronary  artery disease involving native coronary artery of native heart without angina pectoris (Primary)-no clinical signs of ischemia.  Stable.    2. Stented coronary artery-x5 with the most recent being in 2019.  Patient continues on aspirin.  Denies bleeding.    3. PAF (paroxysmal atrial fibrillation) (HCC)- status post ablation 2/12/2025. In sinus rhythm on EKG today.  Anticoagulated. Stable.  Patient is to continue Eliquis until 5/12/2025, which is 90 days post ablation.    4. S/P ablation of atrial fibrillation- 2019 in [place] and subsequent ablation with PVI 2/12/25 per Dr. Cash.     5. Presence of Watchman left atrial appendage closure device-2019.    6. LVH (left ventricular hypertrophy)-mild concentric hypertrophy on 2D echo 2/16/2022.  Stable.    7. Chronic diastolic congestive heart failure (HCC)-grade 2 left ventricular diastolic dysfunction on 2D echo 2/16/2022. Normal on most recent 2d echo. NYHA class III.  Stage C. Reviewed signs and symptoms of CHF and what to report with the patient. Patient instructed to restrict sodium and weigh daily. Report weight gain of greater than 2 lbs overnight or 5 lbs in 1 week. Pt verbalized understanding of instructions and plan of care.  Continue Entresto and spironolactone.  Jardiance was discontinued for unknown reasons. Resume Jardiance 10 mg daily.     8. Pulmonary hypertension (HCC)-mild on 2D echo 2/16/2022.  Likely group 2 related to left sided heart disease. Stable.    9. Primary hypertension-blood pressure is well-controlled. Continue Entresto, spironolactone and amlodipine.  Monitor and record daily blood pressure. Report readings consistently higher than 130/80 or consistently lower than 100/60.     10. Mixed hyperlipidemia-management per PCP.  Continue atorvastatin.    11.  Bifascicular block-chronic.  Stable.    12. Obstructive sleep apnea- pt is non-compliant with CPAP. He has been advised of potential health risks of untreated sleep apnea, including  hypertension, atrial fibrillation, CHF and increased risk of stroke. He verbalized understanding.     13. Class 1 obesity due to excess calories with serious comorbidity and body mass index (BMI) of 30.0 to 30.9 in adult- BMI is >= 30 and <35. (Class 1 Obesity). The following options were offered after discussion;: weight loss educational material (shared in after visit summary).     14.  Atypical atrial flutter-patient is following with electrophysiology.  Continue amiodarone.    15. Sinus bradycardia- pt is not on any rate lowering medications. Check 14 day holter monitor.     16. Renal insufficiency- pt reports abnormal renal function on [facility] He was advised to establish with a nephrologist. He has requested to be referred to Dr. Santoro with [facility].     Follow Up   Return in about 8 weeks (around 5/30/2025) for Next scheduled follow up.  Patient was given instructions and counseling regarding his condition or for health maintenance advice. Please see specific information pulled into the AVS if appropriate.

## 2025-04-04 ENCOUNTER — OFFICE VISIT (OUTPATIENT)
Dept: CARDIOLOGY | Facility: CLINIC | Age: 85
End: 2025-04-04
Payer: MEDICARE

## 2025-04-04 VITALS
WEIGHT: 232 LBS | HEIGHT: 73 IN | SYSTOLIC BLOOD PRESSURE: 114 MMHG | BODY MASS INDEX: 30.75 KG/M2 | OXYGEN SATURATION: 100 % | DIASTOLIC BLOOD PRESSURE: 69 MMHG | HEART RATE: 49 BPM

## 2025-04-04 DIAGNOSIS — I25.10 CORONARY ARTERY DISEASE INVOLVING NATIVE CORONARY ARTERY OF NATIVE HEART WITHOUT ANGINA PECTORIS: Primary | ICD-10-CM

## 2025-04-04 DIAGNOSIS — I51.7 LVH (LEFT VENTRICULAR HYPERTROPHY): ICD-10-CM

## 2025-04-04 DIAGNOSIS — R00.1 BRADYCARDIA, SINUS: ICD-10-CM

## 2025-04-04 DIAGNOSIS — E66.09 CLASS 1 OBESITY DUE TO EXCESS CALORIES WITH SERIOUS COMORBIDITY AND BODY MASS INDEX (BMI) OF 30.0 TO 30.9 IN ADULT: ICD-10-CM

## 2025-04-04 DIAGNOSIS — E78.2 MIXED HYPERLIPIDEMIA: ICD-10-CM

## 2025-04-04 DIAGNOSIS — I48.4 ATYPICAL ATRIAL FLUTTER: ICD-10-CM

## 2025-04-04 DIAGNOSIS — Z98.890 S/P ABLATION OF ATRIAL FIBRILLATION: ICD-10-CM

## 2025-04-04 DIAGNOSIS — Z95.5 STENTED CORONARY ARTERY: ICD-10-CM

## 2025-04-04 DIAGNOSIS — Z86.79 S/P ABLATION OF ATRIAL FIBRILLATION: ICD-10-CM

## 2025-04-04 DIAGNOSIS — I45.2 BIFASCICULAR BLOCK: ICD-10-CM

## 2025-04-04 DIAGNOSIS — I10 PRIMARY HYPERTENSION: ICD-10-CM

## 2025-04-04 DIAGNOSIS — N28.9 RENAL INSUFFICIENCY: ICD-10-CM

## 2025-04-04 DIAGNOSIS — I48.0 PAROXYSMAL A-FIB: ICD-10-CM

## 2025-04-04 DIAGNOSIS — G47.33 OSA (OBSTRUCTIVE SLEEP APNEA): ICD-10-CM

## 2025-04-04 DIAGNOSIS — E66.811 CLASS 1 OBESITY DUE TO EXCESS CALORIES WITH SERIOUS COMORBIDITY AND BODY MASS INDEX (BMI) OF 30.0 TO 30.9 IN ADULT: ICD-10-CM

## 2025-04-04 DIAGNOSIS — I27.20 PULMONARY HYPERTENSION: ICD-10-CM

## 2025-04-04 DIAGNOSIS — I50.32 CHRONIC DIASTOLIC CONGESTIVE HEART FAILURE: ICD-10-CM

## 2025-04-04 DIAGNOSIS — Z95.818 PRESENCE OF WATCHMAN LEFT ATRIAL APPENDAGE CLOSURE DEVICE: ICD-10-CM

## 2025-04-04 RX ORDER — SPIRONOLACTONE 50 MG/1
50 TABLET, FILM COATED ORAL DAILY
Qty: 90 TABLET | Refills: 3 | Status: SHIPPED | OUTPATIENT
Start: 2025-04-04

## 2025-04-04 RX ORDER — AMIODARONE HYDROCHLORIDE 200 MG/1
200 TABLET ORAL DAILY
Qty: 90 TABLET | Refills: 3 | Status: SHIPPED | OUTPATIENT
Start: 2025-04-04

## 2025-04-04 RX ORDER — POTASSIUM CITRATE 1080 MG/1
10 TABLET, EXTENDED RELEASE ORAL
COMMUNITY
End: 2025-04-04

## 2025-04-04 RX ORDER — AMLODIPINE BESYLATE 2.5 MG/1
2.5 TABLET ORAL DAILY
Qty: 90 TABLET | Refills: 3 | Status: SHIPPED | OUTPATIENT
Start: 2025-04-04

## 2025-04-04 RX ORDER — ATORVASTATIN CALCIUM 40 MG/1
40 TABLET, FILM COATED ORAL DAILY
Qty: 90 TABLET | Refills: 3 | Status: SHIPPED | OUTPATIENT
Start: 2025-04-04

## 2025-04-04 RX ORDER — SACUBITRIL AND VALSARTAN 97; 103 MG/1; MG/1
1 TABLET, FILM COATED ORAL 2 TIMES DAILY
Qty: 180 TABLET | Refills: 3 | Status: SHIPPED | OUTPATIENT
Start: 2025-04-04

## 2025-04-04 RX ORDER — TESTOSTERONE CYPIONATE 200 MG/ML
INJECTION, SOLUTION INTRAMUSCULAR
COMMUNITY
Start: 2025-02-24

## 2025-04-08 ENCOUNTER — TRANSCRIBE ORDERS (OUTPATIENT)
Dept: ADMINISTRATIVE | Facility: HOSPITAL | Age: 85
End: 2025-04-08
Payer: MEDICARE

## 2025-04-08 DIAGNOSIS — N18.9 CHRONIC KIDNEY DISEASE, UNSPECIFIED CKD STAGE: Primary | ICD-10-CM

## 2025-04-10 ENCOUNTER — TRANSCRIBE ORDERS (OUTPATIENT)
Dept: ADMINISTRATIVE | Age: 85
End: 2025-04-10

## 2025-04-10 DIAGNOSIS — N18.9 CHRONIC KIDNEY DISEASE, UNSPECIFIED CKD STAGE: Primary | ICD-10-CM

## 2025-04-17 ENCOUNTER — HOSPITAL ENCOUNTER (OUTPATIENT)
Dept: ULTRASOUND IMAGING | Facility: HOSPITAL | Age: 85
Discharge: HOME | End: 2025-04-17
Admitting: NURSE PRACTITIONER
Payer: OTHER GOVERNMENT

## 2025-04-17 DIAGNOSIS — N18.9 CHRONIC KIDNEY DISEASE, UNSPECIFIED CKD STAGE: ICD-10-CM

## 2025-04-17 PROCEDURE — 76775 US EXAM ABDO BACK WALL LIM: CPT

## 2025-04-29 ENCOUNTER — TELEPHONE (OUTPATIENT)
Dept: CARDIOLOGY | Facility: CLINIC | Age: 85
End: 2025-04-29
Payer: MEDICARE

## 2025-04-29 NOTE — TELEPHONE ENCOUNTER
Attempted to call patient to discuss side effects reported with his medication received voicemail. Will try to reach back out to patient before the end of day

## 2025-04-29 NOTE — TELEPHONE ENCOUNTER
----- Message from Noemi AKINS sent at 4/29/2025  2:16 PM CDT -----  This patient called wanting to discuss some side effects on his medications he was given. Please give him a call when you can. Thank you.

## 2025-04-29 NOTE — TELEPHONE ENCOUNTER
Patient is wanting to know if it is possible to stop taking amiodarone due to tremors in hands. Patient state that he cannot write. Patient explained that he was sent to [facility] due to tremors where he then had a brain scan. Spoke with MD at the [facility] (He does not recall his name) patient stated that dr recommended that he get off of amiodarone  and look into a to alternative method. Patient also stated that MD recommends propanolol

## 2025-06-06 ENCOUNTER — OFFICE VISIT (OUTPATIENT)
Dept: CARDIOLOGY | Facility: CLINIC | Age: 85
End: 2025-06-06
Payer: MEDICARE

## 2025-06-06 VITALS
DIASTOLIC BLOOD PRESSURE: 75 MMHG | WEIGHT: 228 LBS | HEIGHT: 73 IN | SYSTOLIC BLOOD PRESSURE: 169 MMHG | BODY MASS INDEX: 30.22 KG/M2 | HEART RATE: 61 BPM

## 2025-06-06 DIAGNOSIS — R94.31 ABNORMAL ECG: Primary | ICD-10-CM

## 2025-06-06 DIAGNOSIS — I10 PRIMARY HYPERTENSION: ICD-10-CM

## 2025-06-06 DIAGNOSIS — I48.4 ATYPICAL ATRIAL FLUTTER: ICD-10-CM

## 2025-06-06 DIAGNOSIS — I47.10 PSVT (PAROXYSMAL SUPRAVENTRICULAR TACHYCARDIA): ICD-10-CM

## 2025-06-06 DIAGNOSIS — R22.2 ABDOMINAL WALL LUMP: ICD-10-CM

## 2025-06-06 DIAGNOSIS — Z95.818 PRESENCE OF WATCHMAN LEFT ATRIAL APPENDAGE CLOSURE DEVICE: ICD-10-CM

## 2025-06-06 PROCEDURE — 99212 OFFICE O/P EST SF 10 MIN: CPT | Performed by: INTERNAL MEDICINE

## 2025-06-06 PROCEDURE — 3078F DIAST BP <80 MM HG: CPT | Performed by: INTERNAL MEDICINE

## 2025-06-06 PROCEDURE — 3077F SYST BP >= 140 MM HG: CPT | Performed by: INTERNAL MEDICINE

## 2025-06-06 RX ORDER — ANASTROZOLE 1 MG/1
TABLET ORAL
COMMUNITY
Start: 2025-02-11

## 2025-06-06 NOTE — PROGRESS NOTES
Fred Johnson  [id number]  1940  85 y.o.  male    Referring Provider: Chico Perkins MD    Reason for  Visit:  Here for routine follow up     Subjective    Feels lump below navel and get stinging pain   outpatient cardiac telemetry no arrhythmia   Echo as below     Overall feeling well   No chest pain or shortness of breath     No palpitations  No significant pedal edema    Compliant with medications and dietary advice  Fair effort tolerance    No presyncope or syncope  Compliant with medications    Tolerating current medications well with no untoward side effects   Compliant with prescribed medication regimen. Tries to adhere to cardiac diet.     BP well controlled at home.  120s/70s mm Hg  BP in clinic as below      History of present illness:  Fred Johnson is a 85 y.o. yo male with coronary artery disease stented coronary artery atrial fibrillation  s/p  radiofrequency ablation    who presents today for   Chief Complaint   Patient presents with    Coronary Artery Disease     2 mo   .    History  Past Medical History:   Diagnosis Date    Coronary artery disease    ,   Past Surgical History:   Procedure Laterality Date    ACHILLES TENDON REPAIR      CARDIAC CATHETERIZATION      CARDIAC ELECTROPHYSIOLOGY PROCEDURE N/A 2/12/2025    Procedure: Ablation atrial fibrillation - PFA;  Surgeon: Gonzalo Cash MD;  Location: [facility] CATH INVASIVE LOCATION;  Service: Cardiovascular;  Laterality: N/A;    CATARACT EXTRACTION, BILATERAL      CLAVICLE SURGERY Right     CORONARY STENT PLACEMENT      REPLACEMENT TOTAL KNEE BILATERAL      ROTATOR CUFF REPAIR      WRIST ARTHROPLASTY Right    ,   History reviewed. No pertinent family history.,   Social History     Tobacco Use    Smoking status: Never     Passive exposure: Never    Smokeless tobacco: Never   Vaping Use    Vaping status: Never Used   Substance Use Topics    Alcohol use: Yes     Comment: occ    Drug use: Never   ,     Medications  Current Outpatient  Medications   Medication Sig Dispense Refill    amLODIPine (NORVASC) 2.5 MG tablet Take 1 tablet by mouth Daily. 90 tablet 3    anastrozole (ARIMIDEX) 1 MG tablet Take 1 tablet twice a week by oral route for 30 days.      aspirin 81 MG EC tablet Take 1 tablet by mouth Daily.      atorvastatin (LIPITOR) 40 MG tablet Take 1 tablet by mouth Daily. 90 tablet 3    cholecalciferol (VITAMIN D3) 25 MCG (1000 UT) tablet Take 1 tablet by mouth Daily.      empagliflozin (Jardiance) 10 MG tablet tablet Take 1 tablet by mouth Daily. 90 tablet 3    ibuprofen (ADVIL,MOTRIN) 200 MG tablet Take 1 tablet by mouth Every 6 (Six) Hours As Needed for Mild Pain.      Loratadine (CLARITIN PO) Take  by mouth.      nitroglycerin (NITROSTAT) 0.4 MG SL tablet 1 under the tongue as needed for angina, may repeat q5mins for up three doses 25 tablet 11    sildenafil (VIAGRA) 100 MG tablet Take 1 tablet by mouth As Needed for Erectile Dysfunction.      Testosterone Cypionate (DEPOTESTOTERONE CYPIONATE) 200 MG/ML injection INJECT 1/2 (ONE-HALF) ML INTRAMUSCULARLY TWICE A WEEK      Tildrakizumab-asmn (IIUMYA) 100 MG/ML injection Inject 1 mL under the skin into the appropriate area as directed Every 3 (Three) Months.      amiodarone (PACERONE) 200 MG tablet Take 1 tablet by mouth Daily. (Patient not taking: Reported on 6/6/2025) 90 tablet 3    apixaban (ELIQUIS) 5 MG tablet tablet Take 1 tablet by mouth 2 (Two) Times a Day. (Patient not taking: Reported on 6/6/2025) 60 tablet 3    sacubitril-valsartan (Entresto)  MG tablet Take 1 tablet by mouth 2 (Two) Times a Day. (Patient not taking: Reported on 6/6/2025) 180 tablet 3     No current facility-administered medications for this visit.       Allergies:  Percocet [oxycodone-acetaminophen]    Review of Systems  Review of Systems   Constitutional: Negative.   HENT: Negative.     Eyes: Negative.    Cardiovascular:  Negative for chest pain, claudication, cyanosis, dyspnea on exertion, irregular  "heartbeat, leg swelling, near-syncope, orthopnea, palpitations, paroxysmal nocturnal dyspnea and syncope.   Respiratory: Negative.     Endocrine: Negative.    Hematologic/Lymphatic: Negative.    Skin: Negative.    Gastrointestinal:  Negative for anorexia.   Genitourinary: Negative.    Neurological: Negative.    Psychiatric/Behavioral: Negative.         Objective     Physical Exam:    Vitals:    06/06/25 0900 06/06/25 0947   BP: (!) 182/69 169/75   Pulse: 56 61   Weight: 103 kg (228 lb)    Height: 185.4 cm (73\")       /75   Pulse 61   Ht 185.4 cm (73\")   Wt 103 kg (228 lb)   BMI 30.08 kg/m²     Physical Exam  Constitutional:       Appearance: He is well-developed.   HENT:      Head: Normocephalic.   Neck:      Vascular: Normal carotid pulses. No carotid bruit or JVD.      Trachea: No tracheal tenderness or tracheal deviation.   Cardiovascular:      Rate and Rhythm: Rhythm irregular.      Pulses: Normal pulses.      Heart sounds: Murmur heard.      Systolic murmur is present with a grade of 2/6.   Pulmonary:      Effort: Pulmonary effort is normal.      Breath sounds: No stridor.   Abdominal:      General: There is no distension.      Palpations: Abdomen is soft.      Tenderness: There is no abdominal tenderness.   Musculoskeletal:      Cervical back: No edema.   Skin:     General: Skin is warm.   Neurological:      Mental Status: He is alert.      Cranial Nerves: No cranial nerve deficit.      Sensory: No sensory deficit.   Psychiatric:         Speech: Speech normal.         Behavior: Behavior normal.       2 cm lump below navel with tenderness  Smooth   Possible lipoma   Results Review:      Results for orders placed during the hospital encounter of 02/05/24    Adult Transthoracic Echo Complete w/ Color, Spectral and Contrast if necessary per protocol    Interpretation Summary    Left ventricular systolic function is normal. Left ventricular ejection fraction appears to be 56 - 60%.    Left ventricular " wall thickness is consistent with mild concentric hypertrophy.    Left ventricular diastolic function was normal.    Estimated right ventricular systolic pressure from tricuspid regurgitation is normal (<35 mmHg).        [facility] CARDIOLOGY  [address] [place] [id number] [phone]            Patient Information    Patient Name  Fred Johnson MRN  [id number] Legal Sex  Male  (Age)  1940 (85 y.o.)       Regadenoson Stress Test with Myocardial Perfusion SPECT (Multi Study)    Patient Name: Fred Johnson   Patient MRN: [id number]   Patient : 1940 (83 y.o.)   Legal Sex: Male    Accession Number: [id number]   Date of Study: 24   Ordering Provider: Everton Wasserman MD   Clinical Indications: Known Coronary Artery Disease       [facility]  Performing Staff   ECG Putnam, SPECT Putnam: Everton Wasserman MD    Tech: Bhavna Calixto    Support Staff: Regla Lima RN              [facility] PACS Images     Show images for Stress Test With Myocardial Perfusion One Day       Interpretation Summary         Myocardial perfusion imaging indicates a normal myocardial perfusion study with no evidence of ischemia. Impressions are consistent with a low risk study.    Left ventricular ejection fraction is normal (Calculated EF = 58%).    Diaphragmatic attenuation artifact is present.     ____________________________________________      ____________________________________________________________________________________________________________________________________________  Health maintenance and recommendations    Low salt/ HTN/ Heart healthy carbohydrate restricted cardiac diet   The patient is advised to reduce or avoid caffeine or other cardiac stimulants.   Minimize or avoid  NSAID-type medications      Monitor for any signs of bleeding including red or dark stools. Fall precautions.   Advised staying uptodate with immunizations per established standard guidelines.    Offered to give  patient  a copy of my notes     Questions were encouraged, asked and answered to the patient's  understanding and satisfaction. Questions if any regarding current medications and side effects, need for refills and importance of compliance to medications stressed.    Reviewed available prior notes, consults, prior visits, laboratory findings, radiology and cardiology relevant reports. Updated chart as applicable. I have reviewed the patient's medical history in detail and updated the computerized patient record as relevant.      Updated patient regarding any new or relevant abnormalities on review of records or any new findings on physical exam. Mentioned to patient about purpose of visit and desirable health short and long term goals and objectives.    Primary to monitor CBC CMP Lipid panel and TSH as applicable    ___________________________________________________________________________________________________________________________________________   Procedures    Assessment & Plan   Diagnoses and all orders for this visit:    1. Abnormal ECG (Primary)    2. Atypical atrial flutter    3. Presence of Watchman left atrial appendage closure device    4. PSVT (paroxysmal supraventricular tachycardia)    5. Primary hypertension    6. Abdominal wall lump  -     Ambulatory Referral to General Surgery          Plan      Patient expressed understanding  Encouraged and answered all questions   Discussed with the patient and all questioned fully answered. He will call me if any problems arise.   Discussed results of prior testing with patient : echo, outpatient cardiac telemetry and myocardial perfusion scan    as well electrocardiogram from 3/17/2025    Check BP and heart rates twice daily initially till blood pressures and heart rates under good control and then at least 3x / week,   If blood pressures continue to be well-controlled then can check week a month  at home and bring a recording for review next visit  If BP  >130/85 or < 100/60 persistently over 3 reading 30 mins apart or if heart rates persistently above 100 bpm or less than 55 bpm call sooner for evaluation and advise     Orders Placed This Encounter   Procedures    Ambulatory Referral to General Surgery     Referral Priority:   Routine     Referral Type:   Consultation     Referral Reason:   Specialty Services Required     Requested Specialty:   General Surgery     Number of Visits Requested:   1        Overall doing well no new cardiovascular symptoms and therefore no additional cardiac testing is required prior to next visit  If any interim issues arise will call me for further evaluation.           Return in about 1 year (around 6/6/2026).

## 2025-06-09 ENCOUNTER — TELEPHONE (OUTPATIENT)
Dept: CARDIOLOGY | Facility: CLINIC | Age: 85
End: 2025-06-09

## 2025-06-09 NOTE — TELEPHONE ENCOUNTER
Caller: Fred Johnson    Relationship: Self    Best call back number: [phone]     What is the best time to reach you: ANYTIME    What was the call regarding: PATIENT IS NEEDING SUBMISSION FOR [facility] AUTHORIZATION FOR HIS UPCOMING APPOINTMENT WITH GENERAL SURGERY ON 06.12.25.

## 2025-06-11 NOTE — PROGRESS NOTES
[facility] General Surgery Clinic History and Physical  Fred Johnson  MRN: [id number]  Age: 85 y.o. male     YOB: 1940    Primary   Problem      Concerns for soft tissue mass of the ventral abdomen    SUBJECTIVE  History  of Presenting Illness     History of Present Illness  The patient presents for evaluation of a growth on the side of his navel.    He reports a palpable mass adjacent to his navel, which was previously evaluated by Dr. Orourke, who did not express significant concern. However, due to the patient's history of bladder cancer, further investigation was recommended. He describes the sensation as akin to receiving an injection of thick syrup, lasting approximately 3 to 4 hours. He has no prior history of abdominal surgery.    He had bladder cancer in the past. He is not on Eliquis now but was on it in the past. He had an ablation. He had an ultrasound of his kidneys on 04/30/2025 when he was in the hospital for high potassium.    PAST SURGICAL HISTORY:  Ablation             Past Medical History     Past Medical History:  Past Medical History:   Diagnosis Date    Coronary artery disease        PCP: Chico Perkins MD    Past Surgical History:  Past Surgical History:   Procedure Laterality Date    ACHILLES TENDON REPAIR      CARDIAC CATHETERIZATION      CARDIAC ELECTROPHYSIOLOGY PROCEDURE N/A 2/12/2025    Procedure: Ablation atrial fibrillation - PFA;  Surgeon: Gonzalo Cash MD;  Location: [facility] CATH INVASIVE LOCATION;  Service: Cardiovascular;  Laterality: N/A;    CATARACT EXTRACTION, BILATERAL      CLAVICLE SURGERY Right     CORONARY STENT PLACEMENT      REPLACEMENT TOTAL KNEE BILATERAL      ROTATOR CUFF REPAIR      WRIST ARTHROPLASTY Right        Family History:  No family history on file.    Social History:  Social History     Tobacco Use    Smoking status: Never     Passive exposure: Never    Smokeless tobacco: Never   Substance Use Topics    Alcohol use: Yes     Comment:  "occ       Allergies:  Allergies   Allergen Reactions    Percocet [Oxycodone-Acetaminophen] Nausea And Vomiting       Medications:  Current Outpatient Medications   Medication Instructions    amiodarone (PACERONE) 200 mg, Oral, Daily    amLODIPine (NORVASC) 2.5 mg, Oral, Daily    anastrozole (ARIMIDEX) 1 MG tablet Take 1 tablet twice a week by oral route for 30 days.    apixaban (ELIQUIS) 5 mg, Oral, 2 Times Daily    aspirin 81 mg, Daily    atorvastatin (LIPITOR) 40 mg, Oral, Daily    cholecalciferol (VITAMIN D3) 1,000 Units, Daily    empagliflozin (JARDIANCE) 10 mg, Oral, Daily    ibuprofen (ADVIL,MOTRIN) 200 mg, Every 6 Hours PRN    Loratadine (CLARITIN PO) Take  by mouth.    nitroglycerin (NITROSTAT) 0.4 MG SL tablet 1 under the tongue as needed for angina, may repeat q5mins for up three doses    sacubitril-valsartan (Entresto)  MG tablet 1 tablet, Oral, 2 Times Daily    sildenafil (VIAGRA) 100 mg, As Needed    Testosterone Cypionate (DEPOTESTOTERONE CYPIONATE) 200 MG/ML injection INJECT 1/2 (ONE-HALF) ML INTRAMUSCULARLY TWICE A WEEK    Tildrakizumab-asmn (IIUMYA) 100 mg, Every 3 Months           Review of Systems   Per HPI.    Physical Examination     Vitals:    06/12/25 0759   BP: 178/84   Pulse: 106   SpO2: 98%   Weight: 103 kg (228 lb)   Height: 185.4 cm (73\")       Estimated body mass index is 30.08 kg/m² as calculated from the following:    Height as of this encounter: 185.4 cm (73\").    Weight as of this encounter: 103 kg (228 lb).  PREVIOUS WEIGHTS:   Wt Readings from Last 5 Encounters:   06/12/25 103 kg (228 lb)   06/06/25 103 kg (228 lb)   04/04/25 105 kg (232 lb)   03/17/25 112 kg (246 lb)   02/12/25 111 kg (244 lb 6.4 oz)       Physical Examination:  Gen: awake, alert, sitting up in chair in no acute distress  HEENT: NCAT, EOMI, anicteric sclerae  CV: RRR, normotensive  Resp: nonlabored on room air, sats appropriate  Abd: soft, nontender, nondistended; just to the right of the umbilicus, there is " a small nodular area, that is generally mobile, but is somewhat tender to palpation, no overlying skin changes  MSK: moves all four, no c/c/e  Neuro: no focal deficits, cranial nerves grossly intact  Psy:  appropriate, cooperative      Data Review     Labs:  CBC  Lab Results   Component Value Date    WBC 6.16 02/04/2025    HGB 15.0 02/04/2025    HCT 45.4 02/04/2025     02/04/2025      BMP  Lab Results   Component Value Date     02/04/2025    K 5.1 02/04/2025     02/04/2025    CO2 22.0 02/04/2025    BUN 21 02/04/2025    CREATININE 1.10 02/04/2025    GLUCOSE 147 (H) 02/04/2025    CALCIUM 8.9 02/04/2025         Coag  Lab Results   Component Value Date    PROTIME 12.9 02/04/2025    INR 0.93 02/04/2025          Problem List     Patient Active Problem List   Diagnosis    Stented coronary artery    Coronary artery disease involving native coronary artery of native heart without angina pectoris    Paroxysmal A-fib    Presence of Watchman left atrial appendage closure device    MUIR (dyspnea on exertion)    Mixed hyperlipidemia    Bifascicular block    LVH (left ventricular hypertrophy)    History of loop recorder    S/P ablation of atrial fibrillation    Chronic diastolic congestive heart failure    Pulmonary hypertension    Primary hypertension    Class 1 obesity due to excess calories with serious comorbidity and body mass index (BMI) of 30.0 to 30.9 in adult    STUART (obstructive sleep apnea)    Raynaud's disease without gangrene    Abnormal ECG    PSVT (paroxysmal supraventricular tachycardia)    Atypical atrial flutter    Atrial fibrillation, persistent    Bradycardia, sinus    Renal insufficiency    Soft tissue mass            Assessment/Plan     Assessment & Plan  1.  Soft tissue mass to the right of the umbilicus:  The growth is mobile and firm, likely a small fatty tumor. Given the history of bladder cancer, further investigation is warranted. An ultrasound has been ordered to evaluate the growth.  The patient can either wait for the department to schedule it or go to a facility for a same-day ultrasound. The results will be discussed over the phone to determine the next steps, which may include surgical removal if it causes discomfort or if the nature of the mass is uncertain. The reasons for surgical removal would be either persistent discomfort or uncertainty about the mass's nature.           Smoking cessation: Never smoker  BMI: 30.1-provided information regarding appropriate diet and exercise in his after visit summary.  Med rec complete: yes  VTE: VTE PPX is not indicated.    Time Spent: I spent 30 minutes caring for Fred Johnson on this date of service. This time includes time, independent of any procedures, spent by me in the following activities: preparing for the clinic visit, reviewing tests, obtaining and/or reviewing a separately obtained history, performing a medically appropriate examination and/or evaluation, counseling and educating the patient/family/caregiver, ordering medications, tests, or procedures, and documenting information in the medical record.     Marcus Martines MD  6/12/2025   14:35 CDT    Patient or patient representative verbalized consent for the use of Ambient Listening during the visit with  Marcus Martines MD for chart documentation. 6/12/2025  14:36 CDT

## 2025-06-12 ENCOUNTER — OFFICE VISIT (OUTPATIENT)
Dept: SURGERY | Facility: CLINIC | Age: 85
End: 2025-06-12
Payer: MEDICARE

## 2025-06-12 ENCOUNTER — PATIENT ROUNDING (BHMG ONLY) (OUTPATIENT)
Dept: SURGERY | Facility: CLINIC | Age: 85
End: 2025-06-12
Payer: MEDICARE

## 2025-06-12 VITALS
HEIGHT: 73 IN | HEART RATE: 106 BPM | BODY MASS INDEX: 30.22 KG/M2 | WEIGHT: 228 LBS | SYSTOLIC BLOOD PRESSURE: 178 MMHG | OXYGEN SATURATION: 98 % | DIASTOLIC BLOOD PRESSURE: 84 MMHG

## 2025-06-12 DIAGNOSIS — M79.89 SOFT TISSUE MASS: Primary | ICD-10-CM

## 2025-06-17 ENCOUNTER — OFFICE VISIT (OUTPATIENT)
Dept: CARDIOLOGY | Facility: CLINIC | Age: 85
End: 2025-06-17
Payer: MEDICARE

## 2025-06-17 VITALS
BODY MASS INDEX: 29.29 KG/M2 | HEART RATE: 56 BPM | DIASTOLIC BLOOD PRESSURE: 68 MMHG | OXYGEN SATURATION: 98 % | WEIGHT: 221 LBS | SYSTOLIC BLOOD PRESSURE: 152 MMHG | HEIGHT: 73 IN

## 2025-06-17 DIAGNOSIS — Z95.818 PRESENCE OF WATCHMAN LEFT ATRIAL APPENDAGE CLOSURE DEVICE: ICD-10-CM

## 2025-06-17 DIAGNOSIS — I48.19 ATRIAL FIBRILLATION, PERSISTENT: Primary | ICD-10-CM

## 2025-06-17 DIAGNOSIS — R00.1 BRADYCARDIA, SINUS: ICD-10-CM

## 2025-06-17 DIAGNOSIS — I10 PRIMARY HYPERTENSION: ICD-10-CM

## 2025-06-17 RX ORDER — AMLODIPINE BESYLATE 5 MG/1
5 TABLET ORAL DAILY
Qty: 30 TABLET | Refills: 11 | Status: SHIPPED | OUTPATIENT
Start: 2025-06-17

## 2025-06-17 NOTE — PROGRESS NOTES
EP FOLLOW UP PATIENT VISIT    Chief Complaint  Paroxysmal A-fib (Atypical Atrial Flutter  /Frequent PVC's/Frequent PAC's /Ablation 2.12.25/PSVT /3 Month follow up )    Subjective        History of Present Illness    EP Problems:  1.  Paroxysmal atrial fibrillation  - 2/12/2025: PVI  2.  Presence of a watchman closure device  3.  Bifascicular block  4.  Atypical atrial flutter  5.  Frequent PACs  6.  Frequent PVCs  7.  Typical atrial flutter  - 2019: CTI ablation, outlying facility     Cardiology Problems:  1.  CAD status post PCI  2.  Hypertension  3.  Hyperlipidemia     Medical Problems:  1.  Obstructive sleep apnea  2.  Raynaud's disease      Patient is an 85-year-old male who presents to the clinic today following up for paroxysmal atrial fibrillation, atypical atrial flutter, frequent PVCs, and the presence of a watchman closure device. He has had atrial fibrillation and atrial flutter since at least 2019. That same year he had an ablation for his atrial flutter and a Watchman closure device implanted. In 2024 he began having recurrences of atrial fibrillation as well as atypical atrial flutter. On 2/12/2025 he had a successful PVI ablation for his atrial fibrillation.     Since his last visit he has stopped amiodarone due to tremors. He says that these have improved since stopping the medication. He has also completed his 90 days of Eliquis post ablation. He was recently hospitalized for a few days due to hyperkalemia and his medication regimen has been adjusted. Since then he said he has been struggling with hypertension with systolic blood pressures sometimes in the 170s. He denies any recurrences of atrial fibrillation or atrial flutter that he is aware of. He says that he did not have any bleeding issues while taking his Eliquis. He says he is glad to be off the medication now as he is covered by his Watchman device. He states that his heart rates are typically in the 50s and 60s that he does not take any  "AV pato blocking agent that lowers his heart rate.      Objective   Vital Signs:  /68 (BP Location: Left arm, Patient Position: Sitting, Cuff Size: Adult)   Pulse 56   Ht 185.4 cm (72.99\")   Wt 100 kg (221 lb)   SpO2 98%   BMI 29.16 kg/m²   Estimated body mass index is 29.16 kg/m² as calculated from the following:    Height as of this encounter: 185.4 cm (72.99\").    Weight as of this encounter: 100 kg (221 lb).      Physical Exam  Vitals reviewed.   Constitutional:       Appearance: Normal appearance. He is obese.   Cardiovascular:      Rate and Rhythm: Regular rhythm. Bradycardia present.      Pulses: Normal pulses.           Radial pulses are 2+ on the right side and 2+ on the left side.        Posterior tibial pulses are 2+ on the right side and 2+ on the left side.      Heart sounds: Normal heart sounds.   Pulmonary:      Effort: Pulmonary effort is normal.      Breath sounds: Normal breath sounds.   Musculoskeletal:      Right lower leg: No edema.      Left lower leg: No edema.   Skin:     General: Skin is warm and dry.   Neurological:      Mental Status: He is alert.                  Result Review :  The following data was reviewed by: DANII العراقي on 06/17/2025:  CMP          2/4/2025    09:16   CMP   Glucose 147    BUN 21    Creatinine 1.10    EGFR 66.2    Sodium 138    Potassium 5.1    Chloride 106    Calcium 8.9    BUN/Creatinine Ratio 19.1    Anion Gap 10.0      CBC          2/4/2025    09:16   CBC   WBC 6.16    RBC 4.63    Hemoglobin 15.0    Hematocrit 45.4    MCV 98.1    MCH 32.4    MCHC 33.0    RDW 11.6    Platelets 187      [id number] SCORE   [id number] Score: 5 (6/17/2025  9:11 AM)        ECG 12 Lead    Date/Time: 6/17/2025 9:14 AM  Performed by: Mazin Ni APRN    Authorized by: Mazin Ni APRN  Comparison: compared with previous ECG from 3/17/2025  Rhythm: sinus bradycardia  Ectopy: unifocal PVCs  Rate: bradycardic  BPM: 56  Conduction: right bundle branch block, " left anterior fascicular block and 1st degree AV block  QRS axis: left    Clinical impression: abnormal EKG              Assessment and Plan   Diagnoses and all orders for this visit:    1. Atrial fibrillation, persistent (Primary)  -     ECG 12 Lead    2. Bradycardia, sinus  -     ECG 12 Lead    3. Presence of Watchman left atrial appendage closure device    4. Primary hypertension  -     amLODIPine (NORVASC) 5 MG tablet; Take 1 tablet by mouth Daily.  Dispense: 30 tablet; Refill: 11            Plan:  - Continue taking an 81 mg aspirin for antiplatelet therapy given his Watchman closure device.  - Would recommend against amiodarone in the future given his significant tremors while on this medication.  - No AV pato blocking agent at this time with his baseline bradycardia.  - Will increase his amlodipine to 5 mg daily as he has recently been taken off his Entresto and spironolactone.  - Recommended that he keep a log of his blood pressures for better titration of his hypertension medications.                   Follow Up   Return in about 6 months (around 12/17/2025).  Patient was given instructions and counseling regarding his condition or for health maintenance advice. Please see specific information pulled into the AVS if appropriate.     Part of this note may be an electronic transcription/translation of spoken language to printed text using the Dragon Dictation System.

## 2025-06-23 ENCOUNTER — TELEPHONE (OUTPATIENT)
Dept: SURGERY | Facility: CLINIC | Age: 85
End: 2025-06-23
Payer: MEDICARE

## 2025-06-23 NOTE — TELEPHONE ENCOUNTER
Called patient to let him know ultrasound of soft tissue showed a lipoma. 2 options.   Monitor for now  Excise in OR  Pt would like to monitor for now. Pt will call the office if he decides to have removed.

## (undated) DEVICE — SI AVANTI+ 8F STD W/GW  NO OBT: Brand: AVANTI

## (undated) DEVICE — LIMB HOLDER, WRIST/ANKLE: Brand: DEROYAL

## (undated) DEVICE — SOL NS 500ML

## (undated) DEVICE — SUREFIT, DUAL DISPERSIVE ELECTRODE, CONTACT QUALITY MONITOR: Brand: SUREFIT

## (undated) DEVICE — PAD, DEFIB, ADULT, RADIOTRANS, PHYSIO: Brand: MEDLINE

## (undated) DEVICE — CABL BIPOL W/ALLGTR CLIP/SM 12FT

## (undated) DEVICE — Device: Brand: REFERENCE PATCH CARTO 3

## (undated) DEVICE — IMMOB KN 3PNL DLX CANVS 19IN BLU

## (undated) DEVICE — PULSED FIELD ABLATION CATHETER: Brand: FARAWAVE™

## (undated) DEVICE — TBG PRESS/MONITR FIX M/F LL A/ 48IN STRL

## (undated) DEVICE — SYS CLS VASC/VENI VASCADE MVP 6TO12F

## (undated) DEVICE — PRESSURE MONITORING SET: Brand: TRUWAVE

## (undated) DEVICE — STEERABLE SHEATH CLEAR: Brand: FARADRIVE™

## (undated) DEVICE — DRSNG SURESITE123 6X8IN

## (undated) DEVICE — Device: Brand: SOUNDSTAR

## (undated) DEVICE — SLV CBL IVL 5X96IN

## (undated) DEVICE — OCTA,PERSEID,2-2-2-2-2,D-CURVE: Brand: OCTARAY MAPPING CATHETER

## (undated) DEVICE — PK CATH CARD 30 CA/4

## (undated) DEVICE — SOL IRR NACL 0.9PCT BO 1000ML

## (undated) DEVICE — CATHETER CONNECTION CABLE: Brand: FARASTAR™

## (undated) DEVICE — SYS COL WAST NAMIC IV SGL/LN FML/FIT W/VNT/SPK/HD 72IN

## (undated) DEVICE — Device: Brand: WEBSTER CS

## (undated) DEVICE — SOLIDIFIER LIQ LIQUILOC/PLUS W/TREAT 2000CC

## (undated) DEVICE — SI AVANTI+ 10F STD W/GW: Brand: AVANTI

## (undated) DEVICE — NDL TRNSEP BRK XS LNG 18G 98CM A/

## (undated) DEVICE — KT NDL GUIDE STRL 18GA

## (undated) DEVICE — STPCK 3/WY HP M/RA W/OFF/HNDL 1050PSI STRL

## (undated) DEVICE — INTRO STEER AGILIS NXT MED/CURL 8.5F